# Patient Record
Sex: MALE | Race: WHITE | NOT HISPANIC OR LATINO | Employment: UNEMPLOYED | URBAN - METROPOLITAN AREA
[De-identification: names, ages, dates, MRNs, and addresses within clinical notes are randomized per-mention and may not be internally consistent; named-entity substitution may affect disease eponyms.]

---

## 2018-06-04 ENCOUNTER — OFFICE VISIT (OUTPATIENT)
Dept: FAMILY MEDICINE | Facility: CLINIC | Age: 28
End: 2018-06-04
Payer: COMMERCIAL

## 2018-06-04 VITALS
HEART RATE: 67 BPM | HEIGHT: 67 IN | WEIGHT: 166.5 LBS | DIASTOLIC BLOOD PRESSURE: 65 MMHG | BODY MASS INDEX: 26.13 KG/M2 | TEMPERATURE: 97.9 F | RESPIRATION RATE: 14 BRPM | SYSTOLIC BLOOD PRESSURE: 111 MMHG | OXYGEN SATURATION: 99 %

## 2018-06-04 DIAGNOSIS — Z86.59 HISTORY OF ANXIETY: ICD-10-CM

## 2018-06-04 DIAGNOSIS — Z00.00 ROUTINE GENERAL MEDICAL EXAMINATION AT A HEALTH CARE FACILITY: Primary | ICD-10-CM

## 2018-06-04 DIAGNOSIS — Z11.3 SCREEN FOR STD (SEXUALLY TRANSMITTED DISEASE): ICD-10-CM

## 2018-06-04 DIAGNOSIS — Z11.4 ENCOUNTER FOR SCREENING FOR HIV: ICD-10-CM

## 2018-06-04 DIAGNOSIS — F17.200 TOBACCO USE DISORDER: ICD-10-CM

## 2018-06-04 DIAGNOSIS — Z13.0 SCREENING, ANEMIA, DEFICIENCY, IRON: ICD-10-CM

## 2018-06-04 DIAGNOSIS — Z86.59 HISTORY OF ADHD: ICD-10-CM

## 2018-06-04 DIAGNOSIS — J30.81 CHRONIC ALLERGIC RHINITIS DUE TO ANIMAL HAIR AND DANDER: ICD-10-CM

## 2018-06-04 DIAGNOSIS — Z13.6 ENCOUNTER FOR SCREENING FOR CARDIOVASCULAR DISORDERS: ICD-10-CM

## 2018-06-04 DIAGNOSIS — Z13.1 SCREENING FOR DIABETES MELLITUS: ICD-10-CM

## 2018-06-04 DIAGNOSIS — R59.9 ENLARGED LYMPH NODES: ICD-10-CM

## 2018-06-04 DIAGNOSIS — Z23 NEED FOR TDAP VACCINATION: ICD-10-CM

## 2018-06-04 LAB
BASOPHILS # BLD AUTO: 0 10E9/L (ref 0–0.2)
BASOPHILS NFR BLD AUTO: 0.4 %
DIFFERENTIAL METHOD BLD: NORMAL
EOSINOPHIL # BLD AUTO: 0.3 10E9/L (ref 0–0.7)
EOSINOPHIL NFR BLD AUTO: 5.1 %
ERYTHROCYTE [DISTWIDTH] IN BLOOD BY AUTOMATED COUNT: 12.9 % (ref 10–15)
HCT VFR BLD AUTO: 44.2 % (ref 40–53)
HGB BLD-MCNC: 15.2 G/DL (ref 13.3–17.7)
LYMPHOCYTES # BLD AUTO: 2.1 10E9/L (ref 0.8–5.3)
LYMPHOCYTES NFR BLD AUTO: 37.7 %
MCH RBC QN AUTO: 31.8 PG (ref 26.5–33)
MCHC RBC AUTO-ENTMCNC: 34.4 G/DL (ref 31.5–36.5)
MCV RBC AUTO: 93 FL (ref 78–100)
MONOCYTES # BLD AUTO: 0.6 10E9/L (ref 0–1.3)
MONOCYTES NFR BLD AUTO: 11 %
NEUTROPHILS # BLD AUTO: 2.5 10E9/L (ref 1.6–8.3)
NEUTROPHILS NFR BLD AUTO: 45.8 %
PLATELET # BLD AUTO: 218 10E9/L (ref 150–450)
RBC # BLD AUTO: 4.78 10E12/L (ref 4.4–5.9)
WBC # BLD AUTO: 5.4 10E9/L (ref 4–11)

## 2018-06-04 PROCEDURE — 90471 IMMUNIZATION ADMIN: CPT | Performed by: FAMILY MEDICINE

## 2018-06-04 PROCEDURE — 87591 N.GONORRHOEAE DNA AMP PROB: CPT | Performed by: FAMILY MEDICINE

## 2018-06-04 PROCEDURE — 86803 HEPATITIS C AB TEST: CPT | Performed by: FAMILY MEDICINE

## 2018-06-04 PROCEDURE — 90715 TDAP VACCINE 7 YRS/> IM: CPT | Performed by: FAMILY MEDICINE

## 2018-06-04 PROCEDURE — 80061 LIPID PANEL: CPT | Performed by: FAMILY MEDICINE

## 2018-06-04 PROCEDURE — 36415 COLL VENOUS BLD VENIPUNCTURE: CPT | Performed by: FAMILY MEDICINE

## 2018-06-04 PROCEDURE — 87389 HIV-1 AG W/HIV-1&-2 AB AG IA: CPT | Performed by: FAMILY MEDICINE

## 2018-06-04 PROCEDURE — 85025 COMPLETE CBC W/AUTO DIFF WBC: CPT | Performed by: FAMILY MEDICINE

## 2018-06-04 PROCEDURE — 99395 PREV VISIT EST AGE 18-39: CPT | Mod: 25 | Performed by: FAMILY MEDICINE

## 2018-06-04 PROCEDURE — 80053 COMPREHEN METABOLIC PANEL: CPT | Performed by: FAMILY MEDICINE

## 2018-06-04 PROCEDURE — 87491 CHLMYD TRACH DNA AMP PROBE: CPT | Performed by: FAMILY MEDICINE

## 2018-06-04 PROCEDURE — 86706 HEP B SURFACE ANTIBODY: CPT | Performed by: FAMILY MEDICINE

## 2018-06-04 PROCEDURE — 99213 OFFICE O/P EST LOW 20 MIN: CPT | Mod: 25 | Performed by: FAMILY MEDICINE

## 2018-06-04 PROCEDURE — 86780 TREPONEMA PALLIDUM: CPT | Performed by: FAMILY MEDICINE

## 2018-06-04 PROCEDURE — 84443 ASSAY THYROID STIM HORMONE: CPT | Performed by: FAMILY MEDICINE

## 2018-06-04 PROCEDURE — 87340 HEPATITIS B SURFACE AG IA: CPT | Performed by: FAMILY MEDICINE

## 2018-06-04 ASSESSMENT — ANXIETY QUESTIONNAIRES
2. NOT BEING ABLE TO STOP OR CONTROL WORRYING: NOT AT ALL
3. WORRYING TOO MUCH ABOUT DIFFERENT THINGS: SEVERAL DAYS
7. FEELING AFRAID AS IF SOMETHING AWFUL MIGHT HAPPEN: SEVERAL DAYS
GAD7 TOTAL SCORE: 4
6. BECOMING EASILY ANNOYED OR IRRITABLE: SEVERAL DAYS
5. BEING SO RESTLESS THAT IT IS HARD TO SIT STILL: NOT AT ALL
IF YOU CHECKED OFF ANY PROBLEMS ON THIS QUESTIONNAIRE, HOW DIFFICULT HAVE THESE PROBLEMS MADE IT FOR YOU TO DO YOUR WORK, TAKE CARE OF THINGS AT HOME, OR GET ALONG WITH OTHER PEOPLE: SOMEWHAT DIFFICULT
1. FEELING NERVOUS, ANXIOUS, OR ON EDGE: SEVERAL DAYS

## 2018-06-04 ASSESSMENT — PATIENT HEALTH QUESTIONNAIRE - PHQ9: 5. POOR APPETITE OR OVEREATING: NOT AT ALL

## 2018-06-04 NOTE — PROGRESS NOTES
SUBJECTIVE:   CC: Frandy Mondragon is an 27 year old male who presents for preventative health visit.     Healthy Habits:  Answers for HPI/ROS submitted by the patient on 6/4/2018   Annual Exam:  Getting at least 3 servings of Calcium per day:: Yes  Bi-annual eye exam:: NO  Dental care twice a year:: NO  Sleep apnea or symptoms of sleep apnea:: Excessive snoring  Diet:: Regular (no restrictions)  Frequency of exercise:: 1 day/week  Taking medications regularly:: Yes  Medication side effects:: Not applicable  Additional concerns today:: YES  PHQ-2 Score: 0  Duration of exercise:: 45-60 minutes      New to me, not seen here in a while. Hx of allergic rhinitis given Flonase and epi pen in the pas, had problems learning and ADHD treated with adderall when was a child, history of anxiety, hx of radial fracture in 05 S/P ORIF, & tibial fracture treated a with cast all accidents either while skateboarding or playing hockey and clavicle fracture managed conservatively as an infant, hx of chicken pox, foot contusion, esophagitis, allergic to guinea pig dander noted in 3rd and 5th garde requiring an Epipen to have on hand, allergic to cephalosporins, sulfa, cannabis noted on UA in 2006, last seen in clinic in 2015 when had a URI. Works as a  since age 16. Lives in [facility] [facility]. [place]  negative.     Is a smoker, trying to quit, did cold turkey once and didn't go well, felt gum was disgusting. hesitant to take any meds currently will think about it.     Been with current girlfriend 1 yr, ok with std testing.     PROBLEMS TO ADD ON...   Follow up with allergies symptoms on going not sure what trigger it. Reports has had hx of swollen lymph nodes for 2 yrs and wants to check it out.      Seen 2 yrs ago , sinus issues and left gland in upper throat was swollen , referred to ENT, seen at [facility], dr Chapin, was told had a nasal polyp left nostril/ sinus on mri checked out fine, given few meds to take, didn't do  anything, forgot about it, lump freaking him out. Feels inflamed. No pain, can feel when swallows like something in throat. Was given Flonase and antibiotics at one point.  But didn't help. Also notes has 3 cavities 2 on right one on left side of mouth that need to be fixed, has no Dental insurance.    No fever or chills, frontal headaches occasionally, sinus related, struggling to stay hydrated, as works as a , in the heat and forgets to drink water,  or dizziness, no double or blurry vision, no facial pain, had a earache now better, had a sore throat now better, occasional runny nose,  Some post nasal drip, no itchy eyes or sneezing, no trouble hearing, has trouble smelling, no problems tasting or swallowing, allergic to dander ROM guinea pigs, no exposure since 5th grade when found out, no cough, no chest pain currently, when stressed earlier in job had chest pain, 1 month ago was noted as tightness, none since, no trouble breathing or palpitations, No abdominal pain, heart burn, reflux, nausea or vomiting or diarrhea or constipation, no blood in stools or black stools, no weight loss or night sweats. No dysuria, hematuria, frequency, urgency, hesitancy, incontinence, No pelvic complaints. No leg swelling or joint pain. No rash.    TATI-7   [employer], 2002; Used with Permission) 6/4/2018   1. Feeling nervous, anxious, or on edge 1   2. Not being able to stop or control worrying 0   3. Worrying too much about different things 1   4. Trouble relaxing 0   5. Being so restless that it is hard to sit still 0   6. Becoming easily annoyed or irritable 1   7. Feeling afraid, as if something awful might happen 1   TATI-7 Total Score 4   If you checked any problems, how difficult have they made it for you to do your work, take care of things at home, or get along with other people? Somewhat difficult   PHQ-9 ([employer]) 6/4/2018   1.  Little interest or pleasure in doing things 0   2.  Feeling down, depressed, or hopeless  0   3.  Trouble falling or staying asleep, or sleeping too much 0   4.  Feeling tired or having little energy 1   5.  Poor appetite or overeating 0   6.  Feeling bad about yourself 0   7.  Trouble concentrating 0   8.  Moving slowly or restless 0   9.  Suicidal or self-harm thoughts 0   PHQ-9 Total Score 1   Difficulty at work, home, or with people Somewhat difficult     Today's PHQ-2 Score:   PHQ-2 ( 1999 Pfizer) 6/4/2018   Q1: Little interest or pleasure in doing things 0   Q2: Feeling down, depressed or hopeless 0   PHQ-2 Score 0   Q1: Little interest or pleasure in doing things Not at all   Q2: Feeling down, depressed or hopeless Not at all   PHQ-2 Score 0     Abuse: Current or Past(Physical, Sexual or Emotional)- No  Do you feel safe in your environment - Yes    Social History   Substance Use Topics     Smoking status: Never Smoker     Smokeless tobacco: Never Used     Alcohol use No      If you drink alcohol do you typically have >3 drinks per day or >7 drinks per week? No                      Last PSA: No results found for: PSA    Reviewed orders with patient. Reviewed health maintenance and updated orders accordingly - Yes  Labs reviewed in EPIC  BP Readings from Last 3 Encounters:   06/04/18 111/65   09/11/15 132/79   04/28/08 102/50    Wt Readings from Last 3 Encounters:   06/04/18 166 lb 8 oz (75.5 kg)   09/11/15 168 lb 8 oz (76.4 kg)   04/28/08 142 lb (64.4 kg) (42 %)*     * Growth percentiles are based on CDC 2-20 Years data.                  Patient Active Problem List   Diagnosis     Allergic rhinitis     Other anxiety states     Tobacco use disorder     Past Surgical History:   Procedure Laterality Date     HC OPEN TX RADIAL HEAD/NECK FRACTURE  10/28/05    ORIF of unstable fracture       Social History   Substance Use Topics     Smoking status: Current Every Day Smoker     Types: Cigarettes     Start date: 6/4/2009     Smokeless tobacco: Never Used     Alcohol use Yes      Comment: 0 to 7 / week       Family History   Problem Relation Age of Onset     Family History Negative Mother      Hypothyroidism Mother      Family History Negative Father      Family History Negative Sister          Current Outpatient Prescriptions   Medication Sig Dispense Refill     EPIPEN 2-YENNI Use as directed 1 - 2pack 0     MULTIVITAMINS OR TABS 1 tab per day  0     Allergies   Allergen Reactions     Animal Dander      Primarily guinea pigs and has anaphylactic reaction     Cephalosporins      Ceclor     Sulfa Drugs      Recent Labs   Lab Test  06/04/18   1241   LDL  83   HDL  36*   TRIG  111   ALT  26   CR  0.88   GFRESTIMATED  >90   GFRESTBLACK  >90   POTASSIUM  4.5   TSH  1.51        Reviewed and updated as needed this visit by clinical staff         Reviewed and updated as needed this visit by Provider        Past Medical History:   Diagnosis Date     Allergic rhinitis, cause unspecified     Guinea Products     Attention deficit disorder     Problem list name updated by automated process. Provider to review     Attention deficit disorder without mention of hyperactivity      Closed fracture of lateral condyle of humerus infancy  L     H/O clavicle fracture      Other anxiety states      Other closed fractures of distal end of radius (alone) 10/05     Problems with learning      Tibia fracture     fractured skateboard and hockey , cast      Varicella without mention of complication       Past Surgical History:   Procedure Laterality Date     HC OPEN TX RADIAL HEAD/NECK FRACTURE  10/28/05    ORIF of unstable fracture            ROS:  CONSTITUTIONAL: NEGATIVE for fever, chills, change in weight  INTEGUMENTARY/SKIN: NEGATIVE for worrisome rashes, moles or lesions  EYES: NEGATIVE for vision changes or irritation  ENT: NEGATIVE for ear, mouth and throat problems  RESP: NEGATIVE for significant cough or SOB  CV: NEGATIVE for chest pain, palpitations or peripheral edema  GI: NEGATIVE for nausea, abdominal pain, heartburn, or change in bowel  "habits   male: negative for dysuria, hematuria, decreased urinary stream, erectile dysfunction, urethral discharge  MUSCULOSKELETAL: NEGATIVE for significant arthralgias or myalgia  NEURO: NEGATIVE for weakness, dizziness or paresthesias  PSYCHIATRIC: NEGATIVE for changes in mood or affect    OBJECTIVE:   /65 (BP Location: Left arm, Patient Position: Chair, Cuff Size: Adult Regular)  Pulse 67  Temp 97.9  F (36.6  C) (Oral)  Resp 14  Ht 5' 6.75\" (1.695 m)  Wt 166 lb 8 oz (75.5 kg)  SpO2 99%  BMI 26.27 kg/m2  EXAM:  GENERAL: healthy, alert and no distress  EYES: Eyes grossly normal to inspection, PERRL and conjunctivae and sclerae normal  HENT: ear canals and TM's normal, nose and mouth without ulcers or lesions  NECK: no adenopathy, no asymmetry, masses, or scars and thyroid normal to palpation  RESP: lungs clear to auscultation - no rales, rhonchi or wheezes  CV: regular rate and rhythm, normal S1 S2, no S3 or S4, no murmur, click or rub, no peripheral edema and peripheral pulses strong  ABDOMEN: soft, non tender, no hepatosplenomegaly, no masses and bowel sounds normal   (male): normal male genitalia without lesions or urethral discharge, no hernia  MS: no gross musculoskeletal defects noted, no edema  SKIN: no suspicious lesions or rashes  NEURO: Normal strength and tone, mentation intact and speech normal  PSYCH: mentation appears normal, affect normal/bright    ASSESSMENT/PLAN:   1. Routine general medical examination at a health care facility  Labs today. Smoking cessation advised. Tdap today. Ultrasound neck for complain of swollen nodes though non particularly felt today. Suspect smoking and related sinus congestion and dental cavities playing a role. Depending on ultrasound will determine if needs to see ENT again. Allergy referral in place if no answers. Adult Dental Clinic Resource List that care for the non-insured, all major insurance companies, and/or state insurance programs given.  - " CBC with platelets differential  - Comprehensive metabolic panel  - Lipid panel reflex to direct LDL Fasting  - HIV Antigen Antibody Combo  - VACCINE ADMINISTRATION, INITIAL  - TDAP VACCINE (ADACEL)    2. Enlarged lymph nodes  None felt. Do u/S and if persist or no answer can see ENT. Suspect smoking, post nasal drainage and likely dental cavities may be causing some reactive adenopathy. See the dentist. Resource list given as has no dental insurance, smoking cessation encouraged.   - US Head Neck Soft Tissue; Future  - OFFICE/OUTPT VISIT,EST,LEVL III    3. Chronic allergic rhinitis due to animal hair and dander  Reports Flonase and antihistamines and antibiotics in the past didn't help. Avoiding smoking will help. See allergist if remains an issue. No sinusitis today.   - ALLERGY/ASTHMA ADULT REFERRAL  - OFFICE/OUTPT VISIT,EST,LEVL III    4. Tobacco use disorder  Not ready to quit yet. Cold turkey didn't work. Gum felt disgusting to him. Currently declines meds.    5. History of anxiety  Reports currently manageable. Noted chest tightness once one month ago due to job but hen resolved suspected from increased anxiety at that time.  - TSH with free T4 reflex    6. History of ADHD  As a child no longer on adderall. Able to function at job as a  well.  - TSH with free T4 reflex    7. Need for Tdap vaccination  - VACCINE ADMINISTRATION, INITIAL  - TDAP VACCINE (ADACEL)    8. Screening, anemia, deficiency, iron  - CBC with platelets differential    9. Screening for diabetes mellitus  - Comprehensive metabolic panel    10. Encounter for screening for cardiovascular disorders  - Lipid panel reflex to direct LDL Fasting    11. Encounter for screening for HIV  - HIV Antigen Antibody Combo    12. Screen for STD (sexually transmitted disease)  - NEISSERIA GONORRHOEA PCR  - CHLAMYDIA TRACHOMATIS PCR  - Hepatitis B Surface Antibody  - Hepatitis B surface antigen  - Hepatitis C Screen Reflex to HCV RNA Quant and Genotype  -  "Treponema Abs w Reflex to RPR and Titer    COUNSELING:  Reviewed preventive health counseling, as reflected in patient instructions       Regular exercise       Healthy diet/nutrition       Vision screening       Immunizations    Vaccinated for: TDAP             Safe sex practices/STD prevention       HIV screeninx in teen years, 1x in adult years, and at intervals if high risk       reports that he has never smoked. He has never used smokeless tobacco.  Tobacco Cessation Action Plan: Information offered: Patient not interested at this time  Estimated body mass index is 26.27 kg/(m^2) as calculated from the following:    Height as of this encounter: 5' 6.75\" (1.695 m).    Weight as of this encounter: 166 lb 8 oz (75.5 kg).   Weight management plan: Discussed healthy diet and exercise guidelines and patient will follow up in 12 months in clinic to re-evaluate.    Counseling Resources:  ATP IV Guidelines  Pooled Cohorts Equation Calculator  FRAX Risk Assessment  ICSI Preventive Guidelines  Dietary Guidelines for Americans,   USDA's My Plate  ASA Prophylaxis  Lung CA Screening    Elaine Reed MD  [facility] [place]  "

## 2018-06-04 NOTE — NURSING NOTE
Screening Questionnaire for Adult Immunization    Are you sick today?   No   Do you have allergies to medications, food, a vaccine component or latex?   Yes   Have you ever had a serious reaction after receiving a vaccination?   No   Do you have a long-term health problem with heart disease, lung disease, asthma, kidney disease, metabolic disease (e.g. diabetes), anemia, or other blood disorder?   No   Do you have cancer, leukemia, HIV/AIDS, or any other immune system problem?   No   In the past 3 months, have you taken medications that affect  your immune system, such as prednisone, other steroids, or anticancer drugs; drugs for the treatment of rheumatoid arthritis, Crohn s disease, or psoriasis; or have you had radiation treatments?   No   Have you had a seizure, or a brain or other nervous system problem?   No   During the past year, have you received a transfusion of blood or blood     products, or been given immune (gamma) globulin or antiviral drug?   No   For women: Are you pregnant or is there a chance you could become        pregnant during the next month?   No   Have you received any vaccinations in the past 4 weeks?   No     Immunization questionnaire was positive for at least one answer.  Notified Dr. Elaine Reed.        Per orders of Dr. Elaine Reed, injection of tdap given by Tony Martinez. Patient instructed to remain in clinic for 15 minutes afterwards, and to report any adverse reaction to me immediately.       Screening performed by Tony Martinez on 6/4/2018 at 12:43 PM.

## 2018-06-04 NOTE — LETTER
June 5, 2018      Frandy Mondragon  [address] [facility] [address]        Dear ,    We are writing to inform you of your test results.    negative for Hep C, HIv and Hep b infection  Immune to hepatitis b likely from prior immunization which is good    Resulted Orders   CBC with platelets differential   Result Value Ref Range    WBC 5.4 4.0 - 11.0 10e9/L    RBC Count 4.78 4.4 - 5.9 10e12/L    Hemoglobin 15.2 13.3 - 17.7 g/dL    Hematocrit 44.2 40.0 - 53.0 %    MCV 93 78 - 100 fl    MCH 31.8 26.5 - 33.0 pg    MCHC 34.4 31.5 - 36.5 g/dL    RDW 12.9 10.0 - 15.0 %    Platelet Count 218 150 - 450 10e9/L    Diff Method Automated Method     % Neutrophils 45.8 %    % Lymphocytes 37.7 %    % Monocytes 11.0 %    % Eosinophils 5.1 %    % Basophils 0.4 %    Absolute Neutrophil 2.5 1.6 - 8.3 10e9/L    Absolute Lymphocytes 2.1 0.8 - 5.3 10e9/L    Absolute Monocytes 0.6 0.0 - 1.3 10e9/L    Absolute Eosinophils 0.3 0.0 - 0.7 10e9/L    Absolute Basophils 0.0 0.0 - 0.2 10e9/L   HIV Antigen Antibody Combo   Result Value Ref Range    HIV Antigen Antibody Combo Nonreactive NR^Nonreactive          Comment:      HIV-1 p24 Ag & HIV-1/HIV-2 Ab Not Detected   Hepatitis B Surface Antibody   Result Value Ref Range    Hepatitis B Surface Antibody 116.83 (H) <8.00 m[IU]/mL      Comment:      Reactive, Patient is considered to be immune to infection with hepatitis B   when the value is greater than or equal to 12.0 m[IU]/mL.     Hepatitis B surface antigen   Result Value Ref Range    Hep B Surface Agn Nonreactive NR^Nonreactive   Hepatitis C Screen Reflex to HCV RNA Quant and Genotype   Result Value Ref Range    Hepatitis C Antibody Nonreactive NR^Nonreactive      Comment:      Assay performance characteristics have not been established for newborns,   infants, and children         If you have any questions or concerns, please call the clinic at the number listed above.       Sincerely,        Elaine Reed MD/nr

## 2018-06-04 NOTE — PATIENT INSTRUCTIONS
Labs today   Smoking cessation advised  Tdap today   Ultrasound neck  Suspect smoking and related sinus congestion and dental cavities playing a role  Depending on ultrasound will determine if needs to see ENT again  Allergy referral in place if no answers     Adult Dental Clinic Resource List  Dental clinics that care for the non-insured, all major insurance companies, and/or state insurance programs.    Dental clinics that will take the non-insured (*).    (*) [facility]  [address] [place] [address]  [address] [facility], [facility]  [phone]  Full oral health care  Call at 7:30 am to schedule an appt  Please let staff know if you have [facility] Insurance    (*)  [facility]  [address] [place] [address]  [phone]  Sliding Fee Scale  Walk in hours 8:00 am - 11:00 am  1:00 pm - 3:30 pm will help patients apply for insurance    (*) [facility]  [address] [address]  [phone]  Free extractions  First come, first serve  Call for dentist hours of operation    [place] [facility])  [address], [address], [place] [address]  [phone] (insured only)  Call ahead for appointments, please have patient present when scheduling  Walk in hours 9:00 am - 3:30 pm M-F    [place] [facility]  [address] [place] [address]  [phone]  Must be 55 or older to receive care    (*) [facility] and Locations  Call clinic for hours of operation  [place]  [address], [address], [place] [address]  [phone]  [facility]  [address] [place] [address]  [phone]  [place]  [address] [place], [place] [address] [phone]  [address],  [address], [place] [address]  [phone]    (*) [facility]  [address] [place] [address]  [phone]  Please have patient present when scheduling an appt  M-F:  7:00 am - 5:00 pm  Walk in hours M-F: 9:00 am - 11:00 am/1:00 pm - 3:00 pm    (*) [facility]  [address] [place] [address]  [phone]  Please let staff know if you have [facility] Insurance  Accepts Assured Access Insurance  Sliding Fee Scale  Walk in hours start at 7:30 am  M-Th/F: 8:00 am - 5:00 pm  T: 8:00 am - 6:00 pm  W: 8:00 am - 7:00 pm    (*) [facility]  [address] [place] [address]  [phone]  Free Extractions   Will help only the un-insured  First come, first serve    (*) [facility]  [address] [place] [address]  [phone]  M-Thur: 10:00 am - 5:00 pm  See children ages 3 and above    (*) [facility]  [address] [place] [address]  [phone]  Sliding Fee Scale  Provides dental care for:  Birth - 18 years w/o insurance and ages 19-26 pregnant w/ insurance  M: 8:00 am - 5:00 pm  T: 10:00 am - 7:00 pm  W-Thur-F: 8:30 am - 5:00 pm  Sat: 9:00 am - 1:00 pm by appointments only    (*) [address] [address]  [phone]  M-F: 9:00 am - 5:00 pm    (*) [facility]  [address] [place] [address], [address]  [phone]  Accepts Assured Access Insurance   Sliding Fee Scale  M-F: 7:00 am - 5:00 pm  Walk in hours: 8:00 am - 10:30 am/ 1:00 pm - 3:30 pm    (*) [facility])  Karen Lipscomb, [address] [place] [address]  [phone]  Full oral health care  All extractions will need to have referral completed by a dental clinic   M-F: 8:00 am - 4:00 pm    (*) [facility]  [address] [place] [address] [phone]  Sliding Fee Scale  M-F: 7:30 am - 5:00 pm  Saturday morning appointments are available for children    (*) [facility] ([facility])  [address] [id number] [phone]  Fax:  [phone]  Sliding Fee Scale  M-F: 8:00 am - 5:00 pm    (*) [facility]  Donated dental services not covered by insurance  [phone]    Adding on single dental coverage for Medicare Part A and B patients  [employer]: [phone]  [employer]: [phone]  [facility]: [phone]     Preventive Health Recommendations  Male Ages 26 - 39    Yearly exam:             See your health care provider every year in order to  o   Review health changes.   o   Discuss preventive care.    o   Review your medicines if your doctor has prescribed any.    You should be tested each year for STDs (sexually transmitted diseases), if you re at risk.     After age 35, talk to your provider about cholesterol testing. If you are at risk for heart disease, have your cholesterol tested at least every 5 years.     If you are at risk for diabetes, you should have a diabetes test (fasting glucose).  Shots: Get a flu shot each year. Get a tetanus shot every 10 years.     Nutrition:    Eat at least 5 servings of fruits and vegetables daily.     Eat whole-grain bread, whole-wheat pasta and brown rice instead of white grains and rice.     Talk to your provider about Calcium and Vitamin D.     Lifestyle    Exercise for at least 150 minutes a week (30 minutes a day, 5 days a week). This will help you control your weight and prevent disease.     Limit alcohol to one drink per day.     No smoking.     Wear sunscreen to prevent skin cancer.     See your dentist every six months for an exam and cleaning.

## 2018-06-04 NOTE — MR AVS SNAPSHOT
After Visit Summary   6/4/2018    Frandy Mcgeeaecindy    MRN: [id number]           Patient Information     Date Of Birth          1990        Visit Information        Provider Department      6/4/2018 11:20 AM Elaine Reed MD [facility]        Today's Diagnoses     Routine general medical examination at a health care facility    -  1    Chronic allergic rhinitis due to animal hair and dander        History of anxiety        Family history of attention deficit hyperactivity disorder        Need for Tdap vaccination        Screening, anemia, deficiency, iron        Screening for diabetes mellitus        Encounter for screening for cardiovascular disorders        Encounter for screening for HIV        Screen for STD (sexually transmitted disease)        Enlarged lymph nodes        Tobacco use disorder          Care Instructions    Labs today   Smoking cessation advised  Tdap today   Ultrasound neck  Suspect smoking and related sinus congestion and dental cavities playing a role  Depending on ultrasound will determine if needs to see ENT again  Allergy referral in place if no answers     Adult Dental Clinic Resource List  Dental clinics that care for the non-insured, all major insurance companies, and/or state insurance programs.    Dental clinics that will take the non-insured (*).    (*) [facility]  [address] [place], [place] [address] [place] [address]  [phone]  Full oral health care  Call at 7:30 am to schedule an appt  Please let staff know if you have [facility] Insurance    (*) [facility]  [address] [place], [place] [id number] [phone]  Sliding Fee Scale  Walk in hours 8:00 am - 11:00 am  1:00 pm - 3:30 pm will help patients apply for insurance    (*) [facility]  [address] [id number] [phone]  Free extractions  First come, first serve  Call for dentist hours of operation    [place]  [facility]  [address] [facility], [address], [place] [address]  [phone] (insured only)  Call ahead for appointments, please have patient present when scheduling  Walk in hours 9:00 am - 3:30 pm M-F    [facility]  [address] [place] [address]  [phone]  Must be 55 or older to receive care    (*) [facility] Dental Clinics and Locations  Call clinic for hours of operation  [place]  [address] [place] [address], [facility] [address], [place] [address]  [phone]  [facility]  [address] [place] [address]  [phone]  [place]  [address] [place], [place] [address] [phone]  [address],  [address], [place] [address]  [phone]    (*) [facility]  [address] [place] [address]  [phone]  Please have patient present when scheduling an appt  M-F: 7:00 am - 5:00 pm  Walk in hours M-F: 9:00 am - 11:00 am/1:00 pm - 3:00 pm    (*) [facility]  [address] [place] [address]  [phone]  Please let staff know if you have [facility] Insurance  Accepts Assured Access Insurance  Sliding Fee Scale  Walk in hours start at 7:30 am  M-Th/F: 8:00 am - 5:00 pm  T: 8:00 am - 6:00 pm  W: 8:00 am - 7:00 pm    (*) [facility]  [address] [place] [address]  [phone]  Free Extractions   Will help only the un-insured  First come, first serve    (*) [facility]  [address] [place] [address]  [phone]  M-Thur: 10:00 am - 5:00 pm  See children ages 3 and above    (*) [facility]  [address] [place] [address]  [phone]  Sliding Fee Scale  Provides dental care for:  Birth - 18 years w/o insurance and ages 19-26 pregnant w/ insurance  M: 8:00 am - 5:00 pm  T: 10:00 am - 7:00 pm  Chelsea-F: 8:30 am - 5:00 pm  Sat: 9:00 am - 1:00 pm by appointments only    (*) [facility]  [address] [facility] [place] [address]  [phone]  M-F: 9:00 am - 5:00 pm    (*) [facility]  [address] [place] [address], [facility]  [phone]  Accepts Assured Access Insurance   Sliding Fee Scale  M-F: 7:00 am - 5:00 pm  Walk in hours: 8:00 am - 10:30 am/ 1:00 pm - 3:30 pm    (*) [facility])  Karen Edmondser, [address] [place] [address]  [phone]  Full oral health care  All extractions will need to have referral completed by a dental clinic   M-F: 8:00 am - 4:00 pm    (*) [facility]  [address] [place] [address]  [phone]  Sliding Fee Scale  M-F: 7:30 am - 5:00 pm  Saturday morning appointments are available for children    (*) [facility]  [address] [address]  [phone]  Fax: [phone]  Sliding Fee Scale  M-F: 8:00 am - 5:00 pm    (*) [facility]  Donated dental services not covered by insurance  [phone]    Adding on single dental coverage for Medicare Part A and B patients  [employer]: [phone]  [employer]: [phone]  [facility]: [phone]     Preventive Health Recommendations  Male Ages 26 - 39    Yearly exam:             See your health care provider every year in order to  o   Review health changes.   o   Discuss preventive care.    o   Review your medicines if your doctor has prescribed any.    You should be tested each year for STDs (sexually transmitted diseases), if you re at risk.     After age 35, talk to your provider about cholesterol testing. If you are at risk for heart disease, have your cholesterol tested at least every 5 years.     If you are at risk for diabetes, you should have a diabetes test (fasting glucose).  Shots: Get a flu shot each year. Get a tetanus shot every 10 years.     Nutrition:    Eat at least 5 servings of fruits and vegetables daily.     Eat whole-grain bread, whole-wheat pasta  and brown rice instead of white grains and rice.     Talk to your provider about Calcium and Vitamin D.     Lifestyle    Exercise for at least 150 minutes a week (30 minutes a day, 5 days a week). This will help you control your weight and prevent disease.     Limit alcohol to one drink per day.     No smoking.     Wear sunscreen to prevent skin cancer.     See your dentist every six months for an exam and cleaning.             Follow-ups after your visit        Additional Services     ALLERGY/ASTHMA ADULT REFERRAL       Your provider has referred you to: [facility] - [phone]  [email or web address]  [facility]: [facility] - Nick [phone]   [email or web address]  Blanca [phone]   [email or web address]    Please be aware that coverage of these services is subject to the terms and limitations of your health insurance plan.  Call member services at your health plan with any benefit or coverage questions.      Please bring the following with you to your appointment:    (1) Any X-Rays, CTs or MRIs which have been performed.  Contact the facility where they were done to arrange for  prior to your scheduled appointment.    (2) List of current medications  (3) This referral request   (4) Any documents/labs given to you for this referral                  Your next 10 appointments already scheduled     Jun 06, 2018  8:15 AM CDT   US HEAD NECK SOFT TISSUE with UCUS2   [facility] ([facility])    [address] [address] [phone]           Please bring a list of your medicines (including vitamins, minerals and over-the-counter drugs). Also, tell your doctor about any allergies you may have. Wear comfortable clothes and leave your valuables at home.  You do not need to do anything special to prepare for your exam.  Please call the Imaging Department  "at your exam site with any questions.              Future tests that were ordered for you today     Open Future Orders        Priority Expected Expires Ordered    US Head Neck Soft Tissue Routine  2019            Who to contact     If you have questions or need follow up information about today's clinic visit or your schedule please contact [facility] directly at [phone].  Normal or non-critical lab and imaging results will be communicated to you by MyChart, letter or phone within 4 business days after the clinic has received the results. If you do not hear from us within 7 days, please contact the clinic through [employer] or phone. If you have a critical or abnormal lab result, we will notify you by phone as soon as possible.  Submit refill requests through [employer] or call your pharmacy and they will forward the refill request to us. Please allow 3 business days for your refill to be completed.          Additional Information About Your Visit        [employer] Information     [employer] lets you send messages to your doctor, view your test results, renew your prescriptions, schedule appointments and more. To sign up, go to [email or web address] . Click on \"Log in\" on the left side of the screen, which will take you to the Welcome page. Then click on \"Sign up Now\" on the right side of the page.     You will be asked to enter the access code listed below, as well as some personal information. Please follow the directions to create your username and password.     Your access code is: [email or web address]  Expires: 2018 11:21 AM     Your access code will  in 90 days. If you need help or a new code, please call your [place] clinic or [phone].        Care EveryWhere ID     This is your Care EveryWhere ID. This could be used by other organizations to access your [place] medical records  [id number]        Your Vitals Were     Pulse Temperature Respirations Height Pulse Oximetry BMI " "(Body Mass Index)    67 97.9  F (36.6  C) (Oral) 14 5' 6.75\" (1.695 m) 99% 26.27 kg/m2       Blood Pressure from Last 3 Encounters:   06/04/18 111/65   09/11/15 132/79   04/28/08 102/50    Weight from Last 3 Encounters:   06/04/18 166 lb 8 oz (75.5 kg)   09/11/15 168 lb 8 oz (76.4 kg)   04/28/08 142 lb (64.4 kg) (42 %)*     * Growth percentiles are based on [facility] 2-20 Years data.              We Performed the Following     ALLERGY/ASTHMA ADULT REFERRAL     CBC with platelets differential     CHLAMYDIA TRACHOMATIS PCR     Comprehensive metabolic panel     Hepatitis B Surface Antibody     Hepatitis B surface antigen     Hepatitis C Screen Reflex to HCV RNA Quant and Genotype     HIV Antigen Antibody Combo     Lipid panel reflex to direct LDL Fasting     NEISSERIA GONORRHOEA PCR     TDAP VACCINE (ADACEL)     Treponema Abs w Reflex to RPR and Titer     TSH with free T4 reflex     VACCINE ADMINISTRATION, INITIAL        Primary Care Provider Office Phone # Fax #    Chace Avila PA-C [phone]       [facility] [address] [facility] [address]        Equal Access to Services     MALLY GOMEZ AH: Hadii camille burdeno Sozaira, waaxda luqadaha, qaybta kaalmada adeegyada, mata kaye. So [facility] [phone].    ATENCIÓN: Si habla español, tiene a ngo disposición servicios gratuitos de asistencia lingüística. [facility] [phone].    We comply with applicable federal civil rights laws and [place] laws. We do not discriminate on the basis of race, color, national origin, age, disability, sex, sexual orientation, or gender identity.            Thank you!     Thank you for choosing [facility]  for your care. Our goal is always to provide you with excellent care. Hearing back from our patients is one way we can continue to improve our services. Please take a few minutes to complete the written survey that you may receive in the mail after your visit with " us. Thank you!             Your Updated Medication List - Protect others around you: Learn how to safely use, store and throw away your medicines at www.disposemymeds.org.          This list is accurate as of 6/4/18 12:34 PM.  Always use your most recent med list.                   Brand Name Dispense Instructions for use Diagnosis    EPIPEN 2-YENNI     1 - 2pack    Use as directed    Allergy, unspecified not elsewhere classified       multivitamin per tablet      1 tab per day    Routine general medical examination at a health care facility

## 2018-06-04 NOTE — LETTER
June 5, 2018      Frandy Mondragon  [address] [facility] [address]        Dear ,    We are writing to inform you of your test results.    Results within acceptable limits.  -Normal red blood cell (hgb) levels, normal white blood cell count and normal platelet levels.    Resulted Orders   CBC with platelets differential   Result Value Ref Range    WBC 5.4 4.0 - 11.0 10e9/L    RBC Count 4.78 4.4 - 5.9 10e12/L    Hemoglobin 15.2 13.3 - 17.7 g/dL    Hematocrit 44.2 40.0 - 53.0 %    MCV 93 78 - 100 fl    MCH 31.8 26.5 - 33.0 pg    MCHC 34.4 31.5 - 36.5 g/dL    RDW 12.9 10.0 - 15.0 %    Platelet Count 218 150 - 450 10e9/L    Diff Method Automated Method     % Neutrophils 45.8 %    % Lymphocytes 37.7 %    % Monocytes 11.0 %    % Eosinophils 5.1 %    % Basophils 0.4 %    Absolute Neutrophil 2.5 1.6 - 8.3 10e9/L    Absolute Lymphocytes 2.1 0.8 - 5.3 10e9/L    Absolute Monocytes 0.6 0.0 - 1.3 10e9/L    Absolute Eosinophils 0.3 0.0 - 0.7 10e9/L    Absolute Basophils 0.0 0.0 - 0.2 10e9/L     If you have any questions or concerns, please call the clinic at the number listed above.   Sincerely,  Elaine Reed MD/nr

## 2018-06-04 NOTE — LETTER
June 6, 2018      Frandy Mondragon  [address] [facility] [address]        Dear ,    We are writing to inform you of your test results.    Results within acceptable limits.  -Chlamydia and gonnohrea tests are normal..    Resulted Orders   CBC with platelets differential   Result Value Ref Range    WBC 5.4 4.0 - 11.0 10e9/L    RBC Count 4.78 4.4 - 5.9 10e12/L    Hemoglobin 15.2 13.3 - 17.7 g/dL    Hematocrit 44.2 40.0 - 53.0 %    MCV 93 78 - 100 fl    MCH 31.8 26.5 - 33.0 pg    MCHC 34.4 31.5 - 36.5 g/dL    RDW 12.9 10.0 - 15.0 %    Platelet Count 218 150 - 450 10e9/L    Diff Method Automated Method     % Neutrophils 45.8 %    % Lymphocytes 37.7 %    % Monocytes 11.0 %    % Eosinophils 5.1 %    % Basophils 0.4 %    Absolute Neutrophil 2.5 1.6 - 8.3 10e9/L    Absolute Lymphocytes 2.1 0.8 - 5.3 10e9/L    Absolute Monocytes 0.6 0.0 - 1.3 10e9/L    Absolute Eosinophils 0.3 0.0 - 0.7 10e9/L    Absolute Basophils 0.0 0.0 - 0.2 10e9/L   Comprehensive metabolic panel   Result Value Ref Range    Sodium 139 133 - 144 mmol/L    Potassium 4.5 3.4 - 5.3 mmol/L    Chloride 104 94 - 109 mmol/L    Carbon Dioxide 29 20 - 32 mmol/L    Anion Gap 6 3 - 14 mmol/L    Glucose 92 70 - 99 mg/dL      Comment:      Non Fasting    Urea Nitrogen 22 7 - 30 mg/dL    Creatinine 0.88 0.66 - 1.25 mg/dL    GFR Estimate >90 >60 mL/min/1.7m2      Comment:      Non  GFR Calc    GFR Estimate If Black >90 >60 mL/min/1.7m2      Comment:       GFR Calc    Calcium 9.2 8.5 - 10.1 mg/dL    Bilirubin Total 0.8 0.2 - 1.3 mg/dL    Albumin 4.3 3.4 - 5.0 g/dL    Protein Total 7.5 6.8 - 8.8 g/dL    Alkaline Phosphatase 52 40 - 150 U/L    ALT 26 0 - 70 U/L    AST 20 0 - 45 U/L   Lipid panel reflex to direct LDL Fasting   Result Value Ref Range    Cholesterol 141 <200 mg/dL    Triglycerides 111 <150 mg/dL      Comment:      Non Fasting    HDL Cholesterol 36 (L) >39 mg/dL    LDL Cholesterol Calculated 83 <100 mg/dL       Comment:      Desirable:       <100 mg/dl    Non HDL Cholesterol 105 <130 mg/dL   TSH with free T4 reflex   Result Value Ref Range    TSH 1.51 0.40 - 4.00 mU/L   HIV Antigen Antibody Combo   Result Value Ref Range    HIV Antigen Antibody Combo Nonreactive NR^Nonreactive          Comment:      HIV-1 p24 Ag & HIV-1/HIV-2 Ab Not Detected   NEISSERIA GONORRHOEA PCR   Result Value Ref Range    Specimen Descrip Urine     N Gonorrhea PCR Negative NEG^Negative      Comment:      Negative for N. gonorrhoeae rRNA by transcription mediated amplification.  A negative result by transcription mediated amplification does not preclude   the presence of N. gonorrhoeae infection because results are dependent on   proper and adequate collection, absence of inhibitors, and sufficient rRNA to   be detected.     CHLAMYDIA TRACHOMATIS PCR   Result Value Ref Range    Specimen Description Urine     Chlamydia Trachomatis PCR Negative NEG^Negative      Comment:      Negative for C. trachomatis rRNA by transcription mediated amplification.  A negative result by transcription mediated amplification does not preclude   the presence of C. trachomatis infection because results are dependent on   proper and adequate collection, absence of inhibitors, and sufficient rRNA to   be detected.     Hepatitis B Surface Antibody   Result Value Ref Range    Hepatitis B Surface Antibody 116.83 (H) <8.00 m[IU]/mL      Comment:      Reactive, Patient is considered to be immune to infection with hepatitis B   when the value is greater than or equal to 12.0 m[IU]/mL.     Hepatitis B surface antigen   Result Value Ref Range    Hep B Surface Agn Nonreactive NR^Nonreactive   Hepatitis C Screen Reflex to HCV RNA Quant and Genotype   Result Value Ref Range    Hepatitis C Antibody Nonreactive NR^Nonreactive      Comment:      Assay performance characteristics have not been established for newborns,   infants, and children     Treponema Abs w Reflex to RPR and Titer    Result Value Ref Range    Treponema Antibodies Nonreactive NR^Nonreactive       If you have any questions or concerns, please call the clinic at the number listed above.       Sincerely,        Elaine Reed MD/nr

## 2018-06-04 NOTE — LETTER
June 5, 2018      Frandy Mondragon  [address] [facility] [address]        Dear ,    We are writing to inform you of your test results.    Results within acceptable limits.  -Liver and gallbladder tests are normal. (ALT,AST, Alk phos, bilirubin), kidney function is normal (Cr, GFR), Sodium is normal, Potassium is normal, Calcium is normal, Glucose is normal (diabetes screening test).   -Cholesterol levels (LDL,HDL, Triglycerides) are normal.  ADVISE: rechecking in 1 year.  -TSH (thyroid stimulating hormone) level is normal which indicates normal thyroid function.  negative for syphilis.    Resulted Orders   CBC with platelets differential   Result Value Ref Range    WBC 5.4 4.0 - 11.0 10e9/L    RBC Count 4.78 4.4 - 5.9 10e12/L    Hemoglobin 15.2 13.3 - 17.7 g/dL    Hematocrit 44.2 40.0 - 53.0 %    MCV 93 78 - 100 fl    MCH 31.8 26.5 - 33.0 pg    MCHC 34.4 31.5 - 36.5 g/dL    RDW 12.9 10.0 - 15.0 %    Platelet Count 218 150 - 450 10e9/L    Diff Method Automated Method     % Neutrophils 45.8 %    % Lymphocytes 37.7 %    % Monocytes 11.0 %    % Eosinophils 5.1 %    % Basophils 0.4 %    Absolute Neutrophil 2.5 1.6 - 8.3 10e9/L    Absolute Lymphocytes 2.1 0.8 - 5.3 10e9/L    Absolute Monocytes 0.6 0.0 - 1.3 10e9/L    Absolute Eosinophils 0.3 0.0 - 0.7 10e9/L    Absolute Basophils 0.0 0.0 - 0.2 10e9/L   Comprehensive metabolic panel   Result Value Ref Range    Sodium 139 133 - 144 mmol/L    Potassium 4.5 3.4 - 5.3 mmol/L    Chloride 104 94 - 109 mmol/L    Carbon Dioxide 29 20 - 32 mmol/L    Anion Gap 6 3 - 14 mmol/L    Glucose 92 70 - 99 mg/dL      Comment:      Non Fasting    Urea Nitrogen 22 7 - 30 mg/dL    Creatinine 0.88 0.66 - 1.25 mg/dL    GFR Estimate >90 >60 mL/min/1.7m2      Comment:      Non  GFR Calc    GFR Estimate If Black >90 >60 mL/min/1.7m2      Comment:       GFR Calc    Calcium 9.2 8.5 - 10.1 mg/dL    Bilirubin Total 0.8 0.2 - 1.3 mg/dL    Albumin 4.3 3.4 -  5.0 g/dL    Protein Total 7.5 6.8 - 8.8 g/dL    Alkaline Phosphatase 52 40 - 150 U/L    ALT 26 0 - 70 U/L    AST 20 0 - 45 U/L   Lipid panel reflex to direct LDL Fasting   Result Value Ref Range    Cholesterol 141 <200 mg/dL    Triglycerides 111 <150 mg/dL      Comment:      Non Fasting    HDL Cholesterol 36 (L) >39 mg/dL    LDL Cholesterol Calculated 83 <100 mg/dL      Comment:      Desirable:       <100 mg/dl    Non HDL Cholesterol 105 <130 mg/dL   TSH with free T4 reflex   Result Value Ref Range    TSH 1.51 0.40 - 4.00 mU/L   HIV Antigen Antibody Combo   Result Value Ref Range    HIV Antigen Antibody Combo Nonreactive NR^Nonreactive          Comment:      HIV-1 p24 Ag & HIV-1/HIV-2 Ab Not Detected   Hepatitis B Surface Antibody   Result Value Ref Range    Hepatitis B Surface Antibody 116.83 (H) <8.00 m[IU]/mL      Comment:      Reactive, Patient is considered to be immune to infection with hepatitis B   when the value is greater than or equal to 12.0 m[IU]/mL.     Hepatitis B surface antigen   Result Value Ref Range    Hep B Surface Agn Nonreactive NR^Nonreactive   Hepatitis C Screen Reflex to HCV RNA Quant and Genotype   Result Value Ref Range    Hepatitis C Antibody Nonreactive NR^Nonreactive      Comment:      Assay performance characteristics have not been established for newborns,   infants, and children     Treponema Abs w Reflex to RPR and Titer   Result Value Ref Range    Treponema Antibodies Nonreactive NR^Nonreactive       If you have any questions or concerns, please call the clinic at the number listed above.       Sincerely,        Elaine Reed MD/nr

## 2018-06-04 NOTE — PROGRESS NOTES
Results within acceptable limits.  -Normal red blood cell (hgb) levels, normal white blood cell count and normal platelet levels.

## 2018-06-05 LAB
ALBUMIN SERPL-MCNC: 4.3 G/DL (ref 3.4–5)
ALP SERPL-CCNC: 52 U/L (ref 40–150)
ALT SERPL W P-5'-P-CCNC: 26 U/L (ref 0–70)
ANION GAP SERPL CALCULATED.3IONS-SCNC: 6 MMOL/L (ref 3–14)
AST SERPL W P-5'-P-CCNC: 20 U/L (ref 0–45)
BILIRUB SERPL-MCNC: 0.8 MG/DL (ref 0.2–1.3)
BUN SERPL-MCNC: 22 MG/DL (ref 7–30)
C TRACH DNA SPEC QL NAA+PROBE: NEGATIVE
CALCIUM SERPL-MCNC: 9.2 MG/DL (ref 8.5–10.1)
CHLORIDE SERPL-SCNC: 104 MMOL/L (ref 94–109)
CHOLEST SERPL-MCNC: 141 MG/DL
CO2 SERPL-SCNC: 29 MMOL/L (ref 20–32)
CREAT SERPL-MCNC: 0.88 MG/DL (ref 0.66–1.25)
GFR SERPL CREATININE-BSD FRML MDRD: >90 ML/MIN/1.7M2
GLUCOSE SERPL-MCNC: 92 MG/DL (ref 70–99)
HBV SURFACE AB SERPL IA-ACNC: 116.83 M[IU]/ML
HBV SURFACE AG SERPL QL IA: NONREACTIVE
HCV AB SERPL QL IA: NONREACTIVE
HDLC SERPL-MCNC: 36 MG/DL
HIV 1+2 AB+HIV1 P24 AG SERPL QL IA: NONREACTIVE
LDLC SERPL CALC-MCNC: 83 MG/DL
N GONORRHOEA DNA SPEC QL NAA+PROBE: NEGATIVE
NONHDLC SERPL-MCNC: 105 MG/DL
POTASSIUM SERPL-SCNC: 4.5 MMOL/L (ref 3.4–5.3)
PROT SERPL-MCNC: 7.5 G/DL (ref 6.8–8.8)
SODIUM SERPL-SCNC: 139 MMOL/L (ref 133–144)
SPECIMEN SOURCE: NORMAL
SPECIMEN SOURCE: NORMAL
T PALLIDUM AB SER QL: NONREACTIVE
TRIGL SERPL-MCNC: 111 MG/DL
TSH SERPL DL<=0.005 MIU/L-ACNC: 1.51 MU/L (ref 0.4–4)

## 2018-06-05 ASSESSMENT — ANXIETY QUESTIONNAIRES: GAD7 TOTAL SCORE: 4

## 2018-06-05 ASSESSMENT — PATIENT HEALTH QUESTIONNAIRE - PHQ9: SUM OF ALL RESPONSES TO PHQ QUESTIONS 1-9: 1

## 2018-06-05 NOTE — PROGRESS NOTES
Results within acceptable limits.  -Liver and gallbladder tests are normal. (ALT,AST, Alk phos, bilirubin), kidney function is normal (Cr, GFR), Sodium is normal, Potassium is normal, Calcium is normal, Glucose is normal (diabetes screening test).   -Cholesterol levels (LDL,HDL, Triglycerides) are normal.  ADVISE: rechecking in 1 year.  -TSH (thyroid stimulating hormone) level is normal which indicates normal thyroid function.  negative for syphilis.

## 2018-06-05 NOTE — PROGRESS NOTES
negative for Hep C, HIv and Hep b infection  Immune to hepatitis b likely from prior immunization which is good

## 2018-06-06 ENCOUNTER — RADIANT APPOINTMENT (OUTPATIENT)
Dept: ULTRASOUND IMAGING | Facility: CLINIC | Age: 28
End: 2018-06-06
Attending: FAMILY MEDICINE
Payer: COMMERCIAL

## 2018-06-06 DIAGNOSIS — R59.9 ENLARGED LYMPH NODES: ICD-10-CM

## 2018-06-06 NOTE — PROGRESS NOTES
Kaden Mr. Mondragon,  Your neck ultrasound shows no abnormal lumps or lymph nodes.  If you have any further concerns please do not hesitate to contact us by message, phone or making an appointment.  Have a good day   Dr Derek KRISHNA

## 2018-09-27 ENCOUNTER — TELEPHONE (OUTPATIENT)
Dept: OTHER | Facility: CLINIC | Age: 28
End: 2018-09-27

## 2019-12-08 ENCOUNTER — HEALTH MAINTENANCE LETTER (OUTPATIENT)
Age: 29
End: 2019-12-08

## 2021-01-10 ENCOUNTER — HEALTH MAINTENANCE LETTER (OUTPATIENT)
Age: 31
End: 2021-01-10

## 2021-10-23 ENCOUNTER — HEALTH MAINTENANCE LETTER (OUTPATIENT)
Age: 31
End: 2021-10-23

## 2022-02-12 ENCOUNTER — HEALTH MAINTENANCE LETTER (OUTPATIENT)
Age: 32
End: 2022-02-12

## 2022-04-25 ENCOUNTER — OFFICE VISIT (OUTPATIENT)
Dept: FAMILY MEDICINE | Facility: CLINIC | Age: 32
End: 2022-04-25
Payer: COMMERCIAL

## 2022-04-25 VITALS
WEIGHT: 177 LBS | BODY MASS INDEX: 27.78 KG/M2 | TEMPERATURE: 98.1 F | HEIGHT: 67 IN | DIASTOLIC BLOOD PRESSURE: 83 MMHG | SYSTOLIC BLOOD PRESSURE: 123 MMHG | OXYGEN SATURATION: 98 % | HEART RATE: 86 BPM

## 2022-04-25 DIAGNOSIS — R09.81 CONGESTION OF PARANASAL SINUS: ICD-10-CM

## 2022-04-25 DIAGNOSIS — Z87.09 HISTORY OF URI (UPPER RESPIRATORY INFECTION): ICD-10-CM

## 2022-04-25 DIAGNOSIS — F41.9 ANXIETY: ICD-10-CM

## 2022-04-25 DIAGNOSIS — F17.200 TOBACCO USE DISORDER: ICD-10-CM

## 2022-04-25 DIAGNOSIS — Z87.898 HISTORY OF NECK SWELLING: Primary | ICD-10-CM

## 2022-04-25 DIAGNOSIS — K02.9 DENTAL CAVITIES: ICD-10-CM

## 2022-04-25 DIAGNOSIS — R09.82 POST-NASAL DRIP: ICD-10-CM

## 2022-04-25 DIAGNOSIS — J30.81 ALLERGIC RHINITIS DUE TO ANIMAL HAIR AND DANDER: ICD-10-CM

## 2022-04-25 DIAGNOSIS — R12 HEART BURN: ICD-10-CM

## 2022-04-25 DIAGNOSIS — R09.A2 GLOBUS SENSATION: ICD-10-CM

## 2022-04-25 DIAGNOSIS — R22.0 LUMP ON FACE: ICD-10-CM

## 2022-04-25 DIAGNOSIS — R03.0 ELEVATED BLOOD PRESSURE READING WITHOUT DIAGNOSIS OF HYPERTENSION: ICD-10-CM

## 2022-04-25 DIAGNOSIS — R09.81 NASAL CONGESTION: ICD-10-CM

## 2022-04-25 LAB
ALBUMIN SERPL-MCNC: 4.1 G/DL (ref 3.4–5)
ALP SERPL-CCNC: 65 U/L (ref 40–150)
ALT SERPL W P-5'-P-CCNC: 25 U/L (ref 0–70)
ANION GAP SERPL CALCULATED.3IONS-SCNC: 4 MMOL/L (ref 3–14)
AST SERPL W P-5'-P-CCNC: 18 U/L (ref 0–45)
BASOPHILS # BLD AUTO: 0 10E3/UL (ref 0–0.2)
BASOPHILS NFR BLD AUTO: 0 %
BILIRUB SERPL-MCNC: 0.4 MG/DL (ref 0.2–1.3)
BUN SERPL-MCNC: 25 MG/DL (ref 7–30)
CALCIUM SERPL-MCNC: 9 MG/DL (ref 8.5–10.1)
CHLORIDE BLD-SCNC: 107 MMOL/L (ref 94–109)
CO2 SERPL-SCNC: 28 MMOL/L (ref 20–32)
CREAT SERPL-MCNC: 0.99 MG/DL (ref 0.66–1.25)
EOSINOPHIL # BLD AUTO: 0.3 10E3/UL (ref 0–0.7)
EOSINOPHIL NFR BLD AUTO: 5 %
ERYTHROCYTE [DISTWIDTH] IN BLOOD BY AUTOMATED COUNT: 11.8 % (ref 10–15)
GFR SERPL CREATININE-BSD FRML MDRD: >90 ML/MIN/1.73M2
GLUCOSE BLD-MCNC: 99 MG/DL (ref 70–99)
HCT VFR BLD AUTO: 43.2 % (ref 40–53)
HGB BLD-MCNC: 14.9 G/DL (ref 13.3–17.7)
IMM GRANULOCYTES # BLD: 0 10E3/UL
IMM GRANULOCYTES NFR BLD: 0 %
LYMPHOCYTES # BLD AUTO: 2.4 10E3/UL (ref 0.8–5.3)
LYMPHOCYTES NFR BLD AUTO: 36 %
MCH RBC QN AUTO: 30.7 PG (ref 26.5–33)
MCHC RBC AUTO-ENTMCNC: 34.5 G/DL (ref 31.5–36.5)
MCV RBC AUTO: 89 FL (ref 78–100)
MONOCYTES # BLD AUTO: 0.6 10E3/UL (ref 0–1.3)
MONOCYTES NFR BLD AUTO: 10 %
NEUTROPHILS # BLD AUTO: 3.3 10E3/UL (ref 1.6–8.3)
NEUTROPHILS NFR BLD AUTO: 49 %
PLATELET # BLD AUTO: 295 10E3/UL (ref 150–450)
POTASSIUM BLD-SCNC: 4 MMOL/L (ref 3.4–5.3)
PROT SERPL-MCNC: 7.5 G/DL (ref 6.8–8.8)
RBC # BLD AUTO: 4.85 10E6/UL (ref 4.4–5.9)
SODIUM SERPL-SCNC: 139 MMOL/L (ref 133–144)
TSH SERPL DL<=0.005 MIU/L-ACNC: 3.14 MU/L (ref 0.4–4)
WBC # BLD AUTO: 6.7 10E3/UL (ref 4–11)

## 2022-04-25 PROCEDURE — 36415 COLL VENOUS BLD VENIPUNCTURE: CPT | Performed by: FAMILY MEDICINE

## 2022-04-25 PROCEDURE — 99204 OFFICE O/P NEW MOD 45 MIN: CPT | Performed by: FAMILY MEDICINE

## 2022-04-25 PROCEDURE — 80050 GENERAL HEALTH PANEL: CPT | Performed by: FAMILY MEDICINE

## 2022-04-25 ASSESSMENT — ANXIETY QUESTIONNAIRES
GAD7 TOTAL SCORE: 2
5. BEING SO RESTLESS THAT IT IS HARD TO SIT STILL: NOT AT ALL
7. FEELING AFRAID AS IF SOMETHING AWFUL MIGHT HAPPEN: SEVERAL DAYS
2. NOT BEING ABLE TO STOP OR CONTROL WORRYING: NOT AT ALL
6. BECOMING EASILY ANNOYED OR IRRITABLE: NOT AT ALL
GAD7 TOTAL SCORE: 2
7. FEELING AFRAID AS IF SOMETHING AWFUL MIGHT HAPPEN: SEVERAL DAYS
3. WORRYING TOO MUCH ABOUT DIFFERENT THINGS: NOT AT ALL
GAD7 TOTAL SCORE: 2
1. FEELING NERVOUS, ANXIOUS, OR ON EDGE: SEVERAL DAYS
4. TROUBLE RELAXING: NOT AT ALL

## 2022-04-25 ASSESSMENT — PATIENT HEALTH QUESTIONNAIRE - PHQ9
10. IF YOU CHECKED OFF ANY PROBLEMS, HOW DIFFICULT HAVE THESE PROBLEMS MADE IT FOR YOU TO DO YOUR WORK, TAKE CARE OF THINGS AT HOME, OR GET ALONG WITH OTHER PEOPLE: NOT DIFFICULT AT ALL
SUM OF ALL RESPONSES TO PHQ QUESTIONS 1-9: 0
SUM OF ALL RESPONSES TO PHQ QUESTIONS 1-9: 0

## 2022-04-25 NOTE — RESULT ENCOUNTER NOTE
Kaden Mr. Mondragon,  Some of your results came back and are within acceptable limits. -Normal red blood cell (hgb) levels, normal white blood cell count and normal platelet levels.. If you have any further concerns please do not hesitate to contact us by message, phone or making an appointment.  Have a good day   Dr Derek KRISHNA

## 2022-04-25 NOTE — PROGRESS NOTES
Assessment & Plan     History of neck swelling  Exam benign. No lymphadenopathy noted, no mouth lesions noted. Small right facial possible lipoma noted. here today with chronic intermittent sensation of swollen glands since 2016, prior MRI and et referral and u/S in 2018 normal also with chronic intermittent post nasal drip, allergies, mild heart burn and anxiety may be contributing to symptoms and globus sensation. Recently recovered from a URI with left over sinus congestion and neg covid tests at home. Acute symptoms resolved. Has bene fully vaccinated against covid. Also has hx of smoking and dental cavities that may be contributing to reactive lymphadenopathy  Chronic symptoms of swelling in lymph nodes, prior work up negative, suspect due to combination of smoking, allergies, post nasal drip, recent Resp infection, dental cavities, heart burn etc  Lump right face suspect lipoma,   See ENT to evaluate symptoms  Call to make apt with dentist  Resolved URI and sinus symptoms  Currently covid testing not indicated   Some globus sensation in throat could be due to post nasal drip and heart burn  Try Flonase and zyrtec to help with post nasal drip.  Has seasonal allergies & guinea pig dander only. Consider referral to allergist if bothering much in the future. Currently not a big issue.   Freq small meals, avoid eating 3 hrs before bedtime  Continue with smoking cessation  BP elevated recheck better, anxiety playing a role  Referred to a Counsellor  Labs and cxr today to evaluate symptoms  Stop by  to clarify name on chart   Return for a preventive physical   - Otolaryngology Referral; Future    Lump on face  Lump right face suspect lipoma, See ENT to evaluate symptoms  - CBC with platelets and differential; Future  - Otolaryngology Referral; Future  - Dental Referral; Future    History of URI (upper respiratory infection)  Post-nasal drip  Nasal congestion  Congestion of paranasal sinus  Resolved URI  and sinus symptoms  Currently covid testing not indicated   May Try Flonase and zyrtec to help with post nasal drip  - CBC with platelets and differential; Future  - Comprehensive metabolic panel (BMP + Alb, Alk Phos, ALT, AST, Total. Bili, TP); Future  - Otolaryngology Referral; Future    Globus sensation  Some globus sensation in throat could be due to post nasal drip and heart burn  - CBC with platelets and differential; Future  - Comprehensive metabolic panel (BMP + Alb, Alk Phos, ALT, AST, Total. Bili, TP); Future  - TSH with free T4 reflex; Future  - XR Chest 2 Views; Future  - Otolaryngology Referral; Future    Allergic rhinitis due to animal hair and dander/ ( seasonal allergies ( guinea pig dander only)  Try Flonase and zyrtec to help with post nasal drip. Consider referral to allergist if bothering much in the future. Currently not a big issue.   - CBC with platelets and differential; Future  - Comprehensive metabolic panel (BMP + Alb, Alk Phos, ALT, AST, Total. Bili, TP); Future    Tobacco use disorder  Continue with smoking cessation. Counseling  to help with anxiety to reduce stress response of need for smoking  - CBC with platelets and differential; Future  - Comprehensive metabolic panel (BMP + Alb, Alk Phos, ALT, AST, Total. Bili, TP); Future  - XR Chest 2 Views; Future    Heart burn  Freq small meals, avoid eating 3 hrs before bedtime  - CBC with platelets and differential; Future  - Comprehensive metabolic panel (BMP + Alb, Alk Phos, ALT, AST, Total. Bili, TP); Future    Anxiety  - Adult Mental Health  Referral; Future    Dental cavities  - Dental Referral; Future    Elevated blood pressure reading without diagnosis of hypertension  Recheck better. Suspect from anxiety    Review of the result(s) of each unique test - diagnostics since 2018  Diagnosis or treatment significantly limited by social determinants of health - anxiety, not seen since 2018  Ordering of each unique test  48 minutes  "spent on the date of the encounter doing chart review, history and exam, documentation and further activities per the note     Tobacco Cessation:   reports that he has been smoking cigarettes. He started smoking about 12 years ago. He has never used smokeless tobacco.  Tobacco Cessation Action Plan: working on quitting    BMI:   Estimated body mass index is 27.72 kg/m  as calculated from the following:    Height as of this encounter: 1.702 m (5' 7\").    Weight as of this encounter: 80.3 kg (177 lb).   Weight management plan: Discussed healthy diet and exercise guidelines    Regular exercise  See Patient Instructions    Return in about 4 weeks (around 5/23/2022) for Follow up, Routine preventive, with me, in person.    Elaine Reed MD  [facility]   Gem Newman is a 31 year old who presents for the following health issues     HPI   Concern - congest   Onset: for a while   Description: stuffy nose, post nasal drip,   Intensity: mild  Progression of Symptoms:  same  Accompanying Signs & Symptoms: have a sinuses infection a week ago   Previous history of similar problem: no   Precipitating factors:        Worsened by: seasonal allergy dehydrated   Alleviating factors:        Improved by: nothing, meds over the counter to help   Therapies tried and outcome: None  Answers for HPI/ROS submitted by the patient on 4/25/2022  If you checked off any problems, how difficult have these problems made it for you to do your work, take care of things at home, or get along with other people?: Not difficult at all  PHQ9 TOTAL SCORE: 0  TATI 7 TOTAL SCORE: 2    BACKGROUND  31 yr Worked as a  since age 16 & lived in [facility]. Is a smoker, ( did cold turkey once and didn't go well, felt gum was disgusting. hesitant to take any meds), down form 1 ppd to 2 cig a day, Hx of allergic rhinitis given Flonase and epi pen in the past, had problems learning and ADHD treated with adderall " when was a child, history of anxiety, hx of radial fracture in 05 S/P ORIF, & tibial fracture treated a with cast all accidents either while skateboarding or playing hockey and clavicle fracture managed conservatively as an infant, hx of chicken pox, foot contusion, esophagitis, cannabis noted on UA in 2006, last seen in clinic in 2015 when had a URI. allergic to guinea pig dander noted in 3rd and 5th grade requiring an Epipen to have on hand, allergic to cephalosporins, sulfa,   Seen in 2016 for  sinus issues and left gland in upper throat was swollen , referred to ENT, seen at [facility], Dr Chapin, was told had a nasal polyp left nostril/ sinus on mri checked out fine, given few meds to take, didn't do anything, forgot about it, felt then lump freaking him out. Feels inflamed. No pain, can feel when swallows like something in throat. Was given Flonase and antibiotics at one point.  But didn't help. Also noted has 3 cavities 2 on right one on left side of mouth that need to be fixed, had no Dental insurance.  Seen only once first time in 2018 by this provider for a physical & concer about swollen glands but none seen. Suspected smoking, post nasal drainage and likely dental cavities were causing some reactive adenopathy. Advised Flonase, and referred to the allergist and dentist. Given Tdap. Cbc, CMP, lipids, TSH, HIV, Hep C, & B,  Syphilis, GC were normal and immune to Hep B. u/S neck did not show any concerning findings. MN  negative.     CURRENTLY  Not seen since 2018. Here as an acute visit for congestion and swollen lymph nodes. Has been vaccinated x 3 against covid  Since 2018 seen by dentist at the time, to return to them. Never did see the allergist. Not had any care with a doctor since 2018.   Symptoms similar to prior , on and off feel glands I neck under jaw swell up and then gets worried might have throat cancer  Felt swelling under left jaw & neck swollen a times and feels something back of  throat and was freaking him out that he has cancer. Sensation never goes away, since 2016. Becomes more aware of it off and on. Not necessarily hurting. Sometimes tingles. No rash noted.     Several weeks ago felt on both sides and then made this apt. Since then acuteness of right side symptoms subsided  1 week after that started with a sinus infection with headache, mild fever and really clogged up sinus's, was coughing at the time, took several covid tests none came back positive. Was out of work 4 days, then had milder symptoms couple days   Feels fine now. Still with chronic pnd, nasal congestion and sinus better but still there. Doesnt feel ill in any way .    Hx of seasonal allergies, flare up now and then nothing sig  Smoking cig , cutting back quite a bit try to slowly quit from 1ppd to 2 cig a day  Still with a few cavities , to see dentist , trying to get in    Currently No fever or chills, no headache or dizziness, no double or blurry vision, no facial pain, earache, sore throat, runny nose, no trouble hearing, smelling, tasting or swallowing, no cough, no chest pain, trouble breathing or palpitations, No abdominal pain, has mild heart burn, no reflux, nausea or vomiting or diarrhea or constipation, no blood in stools or black stools, no weight loss or night sweats. No dysuria, hematuria, frequency, urgency, hesitancy, incontinence, No pelvic complaints. No leg swelling or joint pain. No rash.    Noted a Lump right cheek noted 2 months ago , no pain,just felt while feeling face. No pain but feels pressure right there  Notices midlly when open close jaw and feels it moves in the skin, but not in face    Anxiety mild, worried about health   Still working as a     Name in chart erroneously showing his moms name as alias and preferred name tried to correct it . Will check at  before leaving    Review of Systems   Constitutional, HEENT, cardiovascular, pulmonary, GI, , musculoskeletal, neuro,  "skin, endocrine and psych systems are negative, except as otherwise noted.      Objective    /83 (BP Location: Right arm, Patient Position: Sitting, Cuff Size: Adult Large)   Pulse 86   Temp 98.1  F (36.7  C) (Temporal)   Ht 1.702 m (5' 7\")   Wt 80.3 kg (177 lb)   SpO2 98%   BMI 27.72 kg/m    Body mass index is 27.72 kg/m .  Physical Exam   GENERAL: healthy, alert and no distress  EYES: Eyes grossly normal to inspection, PERRL and conjunctivae and sclerae normal  HENT: normal cephalic/atraumatic, right ear: normal: no effusions, no erythema, normal landmarks, left ear: occluded with wax, nose and mouth without ulcers or lesions, nasal mucosa edematous , oropharynx clear, oral mucous membranes moist, sinuses: not tender and small lipoma like bb sized lump right face cheek over maxillary area  NECK: no adenopathy, no asymmetry, masses, or scars and thyroid normal to palpation  RESP: lungs clear to auscultation - no rales, rhonchi or wheezes  CV: regular rate and rhythm, normal S1 S2, no S3 or S4, no murmur, click or rub, no peripheral edema and peripheral pulses strong  ABDOMEN: soft, nontender, no hepatosplenomegaly, no masses and bowel sounds normal  MS: no gross musculoskeletal defects noted, no edema  SKIN: no suspicious lesions or rashes  NEURO: Normal strength and tone, mentation intact and speech normal  PSYCH: mentation appears normal, affect normal/bright, anxious, judgement and insight intact and appearance well groomed  LYMPH: no cervical, supraclavicular, axillary, or popliteal or inguinal adenopathy  LYMPH: normal ant/post cervical, supraclavicular nodes    No results found for this or any previous visit (from the past 24 hour(s)).          "

## 2022-04-25 NOTE — RESULT ENCOUNTER NOTE
Kaden  Hljanie,  Your results came back and are within acceptable limits. -Liver and gallbladder tests are normal (ALT,AST, Alk phos, bilirubin), kidney function is normal (Cr, GFR), sodium is normal, potassium is normal, calcium is normal, glucose is normal.  -TSH (thyroid stimulating hormone) level is normal which indicates normal thyroid function.. If you have any further concerns please do not hesitate to contact us by message, phone or making an appointment.  Have a good day   Dr Derek KRISHNA

## 2022-04-25 NOTE — PATIENT INSTRUCTIONS
Chronic symptoms of swelling in lymph nodes, prior owrk up negative, suspect due to combination of smoking, allergies, post nasal drip, recent Resp infection, dental cavities, heart burn etc  Lump right face suspect lipoma,   See ENT to evaluate symptoms  Call to make apt with dentist  Resolved URI and sinus symptoms  Currently covid testing not indicated   Some globus sensation in throat could be due to post nasal drip and heart burn  Try flonase and zyrtec to help with post nasal drip  Freq small meals, avoid eating 3 hrs before bedtime  Continue with smoking cessation  BP elevated recheck better  Referred to a counsellor   Labs and cxr today   Return for a preventive physical

## 2022-04-26 ASSESSMENT — ANXIETY QUESTIONNAIRES: GAD7 TOTAL SCORE: 2

## 2022-04-26 ASSESSMENT — PATIENT HEALTH QUESTIONNAIRE - PHQ9: SUM OF ALL RESPONSES TO PHQ QUESTIONS 1-9: 0

## 2022-05-06 NOTE — TELEPHONE ENCOUNTER
FUTURE VISIT INFORMATION      FUTURE VISIT INFORMATION:    Date: 5/23/22    Time: 9:40AM    [place] [facility] [place]:    Referring provider:  Elaine Reed MD    Referring providers clinic:  [facility]    Reason for visit/diagnosis  Neck swelling/lump referred by Elaine Reed MD in  FAMILY PRAC/IMPEDS    RECORDS REQUESTED FROM:       Clinic name Comments Records Status Imaging Status   [facility] 4/25/22 note and referral from Elaine Reed MD Epic    Imaging 6/6/18 US Head Neck  [facility] PACS   [facility] 1/21/16 note from Dr Abdirahman Chapin Scanned in Epic    [facility]   [facility] MRN: [id number]   2/16/16 CT Paranasal Sinuses Sent to scan 5/6/22 req 5/6/22 - PACS                 5/6/22 9:30AM sent a fax to [facility] for images - [facility]   5/13/22 2:13PM images received in PACS - [facility]

## 2022-05-13 NOTE — TELEPHONE ENCOUNTER
FUTURE VISIT INFORMATION      FUTURE VISIT INFORMATION:    Date: 7/27/22    Time: 9AM    [place] [facility] [place]:    Referring provider:  Elaine Reed MD    Referring providers clinic:  [facility]    Reason for visit/diagnosis  Nasal congestion, referred by Elaine Reed MD per    RECORDS REQUESTED FROM:       Clinic name Comments Records Status Imaging Status   [facility] 4/25/22 note and referral from Elaine Reed MD [facility]     Imaging 6/6/18 US Head Neck  [facility] PACS   [facility] 1/21/16 note from Dr Abdirahman Chapin Scanned in Epic     [facility]   [facility] MRN: [id number]    2/16/16 CT Paranasal Sinuses Sent to scan 5/6/22 req 5/6/22 - PACS

## 2022-05-23 ENCOUNTER — OFFICE VISIT (OUTPATIENT)
Dept: OTOLARYNGOLOGY | Facility: CLINIC | Age: 32
End: 2022-05-23
Payer: COMMERCIAL

## 2022-05-23 ENCOUNTER — PRE VISIT (OUTPATIENT)
Dept: OTOLARYNGOLOGY | Facility: CLINIC | Age: 32
End: 2022-05-23
Payer: COMMERCIAL

## 2022-05-23 VITALS
DIASTOLIC BLOOD PRESSURE: 79 MMHG | WEIGHT: 178 LBS | TEMPERATURE: 98.3 F | HEART RATE: 80 BPM | SYSTOLIC BLOOD PRESSURE: 129 MMHG | HEIGHT: 67 IN | BODY MASS INDEX: 27.94 KG/M2

## 2022-05-23 DIAGNOSIS — R09.81 NASAL CONGESTION: Primary | ICD-10-CM

## 2022-05-23 DIAGNOSIS — R22.0 FACIAL MASS: ICD-10-CM

## 2022-05-23 PROCEDURE — 99203 OFFICE O/P NEW LOW 30 MIN: CPT | Performed by: STUDENT IN AN ORGANIZED HEALTH CARE EDUCATION/TRAINING PROGRAM

## 2022-05-23 ASSESSMENT — PAIN SCALES - GENERAL: PAINLEVEL: NO PAIN (0)

## 2022-05-23 NOTE — PROGRESS NOTES
May 23, 2022      Elaine Reed M.D.   [facility]   [address] [place] [address]        Dear Dr. Reed,     I had the pleasure of meeting Mr. Mondragon today in clinic.    HISTORY OF PRESENT ILLNESS:  As you know, he is a pleasant 31-year-old male referred for several issues.  He has noticed intermittent lymphadenopathy in the left neck over the last several years.  He was previously seen by Dr. [facility]anez [facility] and he was not worried about this.  He has had an ultrasound to evaluate this most recently in 2018 that showed reactive lymph nodes.  The patient reports that the lymph nodes intermittently enlarged and then go back down to normal.  He is also over the last year or so notice some swelling in the right cheek.  He also is complaining of nasal congestion and seasonal allergies.  He has previously tried Flonase without much benefit and  he also uses over-the-counter antihistamines when his seasonal allergies are aggravated.    PAST MEDICAL HISTORY:  None.    PAST SURGICAL HISTORY:  Open reduction internal fixation of a forearm fracture.    MEDICATIONS:  None.    ALLERGIES:    1.  Cephalosporins.  2.  Sulfa drugs.    SOCIAL HISTORY:  He is a current smoker and smokes approximately five cigarettes per day.  He drinks alcohol occasionally.  No drug use.    FAMILY HISTORY:  None.    REVIEW OF SYSTEMS:  A 10-point review of system was performed and noted in the HPI.    PHYSICAL EXAMINATION:  He is alert, in no acute distress.  He has no palpable lymphadenopathy in the neck.  In the right cheek in the area where he feels a lump, I do not appreciate any discrete mass.  Initially, it felt like soft tissue that was slightly more firm, but clearly no masses are palpable.  Later on, he was feeling the anterior edge of the masseter muscle.  No lesions are seen in the oral cavity or oropharynx.    ASSESSMENT/PLAN: A 31-year-old male, overall looks well today  without any concern for malignant process and head and neck.  I think his complaint of intermittent enlargement of lymph nodes in the left neck is  likely benign based on the history.  I was not able to feel any abnormal lymph nodes today.  I was not able to palpate the mass in the right cheek that he is reporting today.  I discussed the limitations of examination with him and offered a CT scan of the neck, although I made it very clear that I think that the benefit of this is low and I do not think that anything significant will be found.  We spent some time today in the visit talking about treatment of seasonal and perennial allergies.  I reviewed with him that I would recommend Flonase to help with his nasal symptoms.  He will try this.  He can follow up with me as needed.  If he would like to discuss nasal congestion or sinus disease in more detail, he can be seen by general otolaryngology or rhinology.      Thank you for allowing me to participate in the care of this patient. If you have any further questions, please do not hesitate to contact me.      Sincerely,      Jonathan Ford M.D.      Head and Neck Surgical Oncology and Microvascular Reconstruction  Department of Otolaryngology - Head and Neck Surgery  [facility]        30 minutes spent on the date of the encounter in chart review, patient visit, review of tests, documentation and/or discussion with other providers about the issues documented above.

## 2022-05-23 NOTE — LETTER
5/23/2022       RE: Frandy Mondragon  [address]  [facility] [address]     Dear Colleague,    Thank you for referring your patient, Frandy Mondragon, to the [facility] [place] [facility]. Please see a copy of my visit note below.    May 23, 2022      Elaine Reed M.D.   [facility]   [address] [place], [place] [id number]        Dear Dr. Reed,     I had the pleasure of meeting Mr. Mondragon today in clinic.    HISTORY OF PRESENT ILLNESS:  As you know, he is a pleasant 31-year-old male referred for several issues.  He has noticed intermittent lymphadenopathy in the left neck over the last several years.  He was previously seen by Dr. Fontanez with [facility] and he was not worried about this.  He has had an ultrasound to evaluate this most recently in 2018 that showed reactive lymph nodes.  The patient reports that the lymph nodes intermittently enlarged and then go back down to normal.  He is also over the last year or so notice some swelling in the right cheek.  He also is complaining of nasal congestion and seasonal allergies.  He has previously tried Flonase without much benefit and  he also uses over-the-counter antihistamines when his seasonal allergies are aggravated.    PAST MEDICAL HISTORY:  None.    PAST SURGICAL HISTORY:  Open reduction internal fixation of a forearm fracture.    MEDICATIONS:  None.    ALLERGIES:    1.  Cephalosporins.  2.  Sulfa drugs.    SOCIAL HISTORY:  He is a current smoker and smokes approximately five cigarettes per day.  He drinks alcohol occasionally.  No drug use.    FAMILY HISTORY:  None.    REVIEW OF SYSTEMS:  A 10-point review of system was performed and noted in the HPI.    PHYSICAL EXAMINATION:  He is alert, in no acute distress.  He has no palpable lymphadenopathy in the neck.  In the right cheek in the area where he feels a lump, I do  not appreciate any discrete mass.  Initially, it felt like soft tissue that was slightly more firm, but clearly no masses are palpable.  Later on, he was feeling the anterior edge of the masseter muscle.  No lesions are seen in the oral cavity or oropharynx.    ASSESSMENT/PLAN: A 31-year-old male, overall looks well today without any concern for malignant process and head and neck.  I think his complaint of intermittent enlargement of lymph nodes in the left neck is  likely benign based on the history.  I was not able to feel any abnormal lymph nodes today.  I was not able to palpate the mass in the right cheek that he is reporting today.  I discussed the limitations of examination with him and offered a CT scan of the neck, although I made it very clear that I think that the benefit of this is low and I do not think that anything significant will be found.  We spent some time today in the visit talking about treatment of seasonal and perennial allergies.  I reviewed with him that I would recommend Flonase to help with his nasal symptoms.  He will try this.  He can follow up with me as needed.  If he would like to discuss nasal congestion or sinus disease in more detail, he can be seen by general otolaryngology or rhinology.      Thank you for allowing me to participate in the care of this patient. If you have any further questions, please do not hesitate to contact me.      Sincerely,      Jonathan Ford M.D.      Head and Neck Surgical Oncology and Microvascular Reconstruction  Department of Otolaryngology - Head and Neck Surgery  [facility]        30 minutes spent on the date of the encounter in chart review, patient visit, review of tests, documentation and/or discussion with other providers about the issues documented above.         Again, thank you for allowing me to participate in the care of your patient.      Sincerely,    Jonathan Ford MD

## 2022-05-23 NOTE — PATIENT INSTRUCTIONS
1. Please follow-up in clinic in as needed with Dr. Ford.  2. Please call the ENT clinic with any questions,concerns, new or worsening symptoms.    -Clinic number is [phone]   - Kerrie's direct line (Dr. Ford's nurse) [phone]

## 2022-07-19 ENCOUNTER — OFFICE VISIT (OUTPATIENT)
Dept: FAMILY MEDICINE | Facility: CLINIC | Age: 32
End: 2022-07-19
Payer: COMMERCIAL

## 2022-07-19 VITALS
BODY MASS INDEX: 26.93 KG/M2 | SYSTOLIC BLOOD PRESSURE: 110 MMHG | RESPIRATION RATE: 20 BRPM | HEART RATE: 64 BPM | TEMPERATURE: 98.1 F | OXYGEN SATURATION: 98 % | WEIGHT: 171.6 LBS | HEIGHT: 67 IN | DIASTOLIC BLOOD PRESSURE: 60 MMHG

## 2022-07-19 DIAGNOSIS — R12 HEART BURN: ICD-10-CM

## 2022-07-19 DIAGNOSIS — Z28.21 PNEUMOCOCCAL VACCINATION DECLINED: ICD-10-CM

## 2022-07-19 DIAGNOSIS — R09.82 POST-NASAL DRIP: ICD-10-CM

## 2022-07-19 DIAGNOSIS — R09.81 NASAL CONGESTION: ICD-10-CM

## 2022-07-19 DIAGNOSIS — Z00.00 ROUTINE HISTORY AND PHYSICAL EXAMINATION OF ADULT: Primary | ICD-10-CM

## 2022-07-19 DIAGNOSIS — J30.9 ALLERGIC RHINITIS, UNSPECIFIED SEASONALITY, UNSPECIFIED TRIGGER: ICD-10-CM

## 2022-07-19 DIAGNOSIS — R09.A2 GLOBUS SENSATION: ICD-10-CM

## 2022-07-19 DIAGNOSIS — Z71.89 ADVANCED DIRECTIVES, COUNSELING/DISCUSSION: ICD-10-CM

## 2022-07-19 DIAGNOSIS — R03.0 ELEVATED BLOOD PRESSURE READING WITHOUT DIAGNOSIS OF HYPERTENSION: ICD-10-CM

## 2022-07-19 DIAGNOSIS — F41.9 ANXIETY: ICD-10-CM

## 2022-07-19 DIAGNOSIS — Z13.6 ENCOUNTER FOR LIPID SCREENING FOR CARDIOVASCULAR DISEASE: ICD-10-CM

## 2022-07-19 DIAGNOSIS — D17.0 LIPOMA OF FACE: ICD-10-CM

## 2022-07-19 DIAGNOSIS — H57.10 PAIN IN EYE, UNSPECIFIED LATERALITY: ICD-10-CM

## 2022-07-19 DIAGNOSIS — Z86.16 HISTORY OF 2019 NOVEL CORONAVIRUS DISEASE (COVID-19): ICD-10-CM

## 2022-07-19 DIAGNOSIS — Z00.00 HEALTH CARE MAINTENANCE: ICD-10-CM

## 2022-07-19 DIAGNOSIS — Z13.220 ENCOUNTER FOR LIPID SCREENING FOR CARDIOVASCULAR DISEASE: ICD-10-CM

## 2022-07-19 DIAGNOSIS — K02.9 DENTAL CAVITIES: ICD-10-CM

## 2022-07-19 DIAGNOSIS — F17.200 TOBACCO USE DISORDER: ICD-10-CM

## 2022-07-19 LAB
CHOLEST SERPL-MCNC: 179 MG/DL
FASTING STATUS PATIENT QL REPORTED: NO
HDLC SERPL-MCNC: 39 MG/DL
LDLC SERPL CALC-MCNC: 119 MG/DL
NONHDLC SERPL-MCNC: 140 MG/DL
TRIGL SERPL-MCNC: 103 MG/DL

## 2022-07-19 PROCEDURE — 99213 OFFICE O/P EST LOW 20 MIN: CPT | Mod: 25 | Performed by: FAMILY MEDICINE

## 2022-07-19 PROCEDURE — 36415 COLL VENOUS BLD VENIPUNCTURE: CPT | Performed by: FAMILY MEDICINE

## 2022-07-19 PROCEDURE — 80061 LIPID PANEL: CPT | Performed by: FAMILY MEDICINE

## 2022-07-19 PROCEDURE — 99395 PREV VISIT EST AGE 18-39: CPT | Performed by: FAMILY MEDICINE

## 2022-07-19 ASSESSMENT — ENCOUNTER SYMPTOMS
SHORTNESS OF BREATH: 0
DIZZINESS: 0
ARTHRALGIAS: 0
COUGH: 0
PARESTHESIAS: 0
FEVER: 0
CONSTIPATION: 0
PALPITATIONS: 0
JOINT SWELLING: 0
FREQUENCY: 0
HEARTBURN: 0
HEADACHES: 0
DIARRHEA: 0
HEMATURIA: 0
WEAKNESS: 0
HEMATOCHEZIA: 0
NERVOUS/ANXIOUS: 0
DYSURIA: 0
NAUSEA: 0
EYE PAIN: 1
ABDOMINAL PAIN: 0
MYALGIAS: 0
SORE THROAT: 0

## 2022-07-19 ASSESSMENT — ANXIETY QUESTIONNAIRES
2. NOT BEING ABLE TO STOP OR CONTROL WORRYING: NOT AT ALL
4. TROUBLE RELAXING: NOT AT ALL
GAD7 TOTAL SCORE: 2
3. WORRYING TOO MUCH ABOUT DIFFERENT THINGS: SEVERAL DAYS
GAD7 TOTAL SCORE: 2
7. FEELING AFRAID AS IF SOMETHING AWFUL MIGHT HAPPEN: NOT AT ALL
6. BECOMING EASILY ANNOYED OR IRRITABLE: SEVERAL DAYS
GAD7 TOTAL SCORE: 2
7. FEELING AFRAID AS IF SOMETHING AWFUL MIGHT HAPPEN: NOT AT ALL
1. FEELING NERVOUS, ANXIOUS, OR ON EDGE: NOT AT ALL
5. BEING SO RESTLESS THAT IT IS HARD TO SIT STILL: NOT AT ALL
8. IF YOU CHECKED OFF ANY PROBLEMS, HOW DIFFICULT HAVE THESE MADE IT FOR YOU TO DO YOUR WORK, TAKE CARE OF THINGS AT HOME, OR GET ALONG WITH OTHER PEOPLE?: NOT DIFFICULT AT ALL

## 2022-07-19 ASSESSMENT — PATIENT HEALTH QUESTIONNAIRE - PHQ9
SUM OF ALL RESPONSES TO PHQ QUESTIONS 1-9: 2
10. IF YOU CHECKED OFF ANY PROBLEMS, HOW DIFFICULT HAVE THESE PROBLEMS MADE IT FOR YOU TO DO YOUR WORK, TAKE CARE OF THINGS AT HOME, OR GET ALONG WITH OTHER PEOPLE: NOT DIFFICULT AT ALL
SUM OF ALL RESPONSES TO PHQ QUESTIONS 1-9: 2

## 2022-07-19 NOTE — RESULT ENCOUNTER NOTE
Delanoyusuf Mr. Mondragon,  Your results came back showing  -LDL(bad) cholesterol level is elevated, HDL(good) cholesterol level is low which can increase your heart disease risk.  A diet high in fat and simple carbohydrates, genetics and being overweight can contribute to this. ADVISE: exercising 150 minutes of aerobic exercise per week (30 minutes for 5 days per week or 50 minutes for 3 days per week are options) and eating a low saturated fat/low carbohydrate diet are helpful to improve this. In 12 months, you should check your fasting cholesterol panel by scheduling a lab-only appointment.. If you have any further concerns please do not hesitate to contact us by message, phone or making an appointment.  Have a good day   Dr Derek KRISHNA

## 2022-07-19 NOTE — PATIENT INSTRUCTIONS
Seen for preventive health and additional concerns today   Self testicular check regularly   Labs today and will make further recommendations once reviewed  BP normal  Referred to a Counsellor for anxiety  Will also have Edson Cee from our clinic too    Heart burn currently not an issue. Diet and lifestyle important  Smoking cessation encouraged  If change mind about meds or referral to [place]  If allergic rhinitis get sworse can refer to allergist  Continue flonase and zyrtec as needed  Nasal congestion post nasal drip, globus sensation chronic and better  Can do ct neck if gets worse  Seen by ENT and no concerning findings noted    See dentist about dental cavities    Lipoma right face benign    Pain corner of eye recently could have bene due to dryness and allergies, may use lubricating drops    Recovered from cvovid recently     Health care maintenance review  Work on health care directives, given honoring choice st review  Consider pneumonia vaccine given smoking hx. Opted not to get today     Return in 1 yr for a preventive physical and sooner in an office visit for nay new concerns.    Preventive Health Recommendations  Male Ages 26 - 39    Yearly exam:             See your health care provider every year in order to  o   Review health changes.   o   Discuss preventive care.    o   Review your medicines if your doctor has prescribed any.  You should be tested each year for STDs (sexually transmitted diseases), if you re at risk.   After age 35, talk to your provider about cholesterol testing. If you are at risk for heart disease, have your cholesterol tested at least every 5 years.   If you are at risk for diabetes, you should have a diabetes test (fasting glucose).  Shots: Get a flu shot each year. Get a tetanus shot every 10 years.     Nutrition:  Eat at least 5 servings of fruits and vegetables daily.   Eat whole-grain bread, whole-wheat pasta and brown rice instead of white grains and rice.    Get adequate Calcium and Vitamin D.     Lifestyle  Exercise for at least 150 minutes a week (30 minutes a day, 5 days a week). This will help you control your weight and prevent disease.   Limit alcohol to one drink per day.   No smoking.   Wear sunscreen to prevent skin cancer.   See your dentist every six months for an exam and cleaning.

## 2022-07-19 NOTE — PROGRESS NOTES
SUBJECTIVE:   CC: Frandy Mondragon is an 31 year old male who presents for preventative health visit.     Patient has been advised of split billing requirements and indicates understanding: Yes  Healthy Habits:     Getting at least 3 servings of Calcium per day:  Yes    Bi-annual eye exam:  NO    Dental care twice a year:  NO    Sleep apnea or symptoms of sleep apnea:  Excessive snoring    Diet:  Regular (no restrictions)    Frequency of exercise:  2-3 days/week    Duration of exercise:  Less than 15 minutes    Taking medications regularly:  Not Applicable    Medication side effects:  Not applicable    PHQ-2 Total Score: 0    Additional concerns today:  No    BACKGROUND  31 yr gentleman,  since age 16, is a smoker, ( did cold turkey once and didn't go well, felt gum was disgusting. hesitant to take any meds), down from 1 ppd to 2 cig a day, Hx of allergic rhinitis, nasal congestion, post nasal drip, globus sensation, given Flonase and epi pen in the past, dental cavities, lipoma face, had problems learning and ADHD treated with adderall when was a child, history of anxiety, hx of heartburn/ esophagitis, hx of radial fracture in 05 S/P ORIF, & tibial fracture treated a with cast all accidents either while skateboarding or playing hockey and clavicle fracture managed conservatively as an infant,  foot contusion,  hx of chicken pox,cannabis noted on UA in 2006, hx of Covid 2022, , allergic to cephalosporins, sulfa,     Seen in clinic in 2015 when had a URI. allergic to guinea pig dander noted in 3rd and 5th grade requiring an Epipen to have on hand, Seen in 2016 for  sinus issues and left gland in upper throat was swollen , referred to ENT, seen at [facility] and [facility], Dr Chapin, was told had a nasal polyp left nostril/ sinus on mri checked out fine, given few meds to take, didn't do anything, forgot about it, felt then lump freaking him out. Felt inflamed. No pain but globus.  Was given Flonase and antibiotics at one  point.  But felt didn't help. Also noted had 3 cavities 2 on right one on left side of mouth that needed to be fixed, but had no dental insurance.  Seen first time in 2018 by this provider for a physical & concern about swollen glands but none seen. Suspected smoking, post nasal drainage and likely dental cavities were causing some reactive adenopathy. Advised Flonase, and referred to the allergist and dentist. Given Tdap. Cbc, CMP, lipids, TSH, HIV, Hep C, & B,  Syphilis, GC were normal and immune to Hep B. u/S neck did not show any concerning findings. [place]  negative.     Seen next 4/25/22 for history of neck swelling. Exam was benign. No lymphadenopathy noted, no mouth lesions noted. Had a small right facial possible lipoma. Noted a chronic intermittent sensation of swollen glands since 2016, prior MRI and ENT referral and u/S in 2018 had been normal, Also endorsed chronic intermittent post nasal drip, allergies, mild heart burn and anxiety may be contributing to symptoms and globus sensation. Had recently recovered from a URI with leftover sinus congestion and neg Covid tests at home. Acute symptoms had resolved. Had been fully vaccinated against Covid. Also had a hx of smoking and dental cavities that were likely contributing to reactive lymphadenopathy. Advised to try Flonase and zyrtec, referred to ENT, advised seeing the dentist and to consider referral to allergist if allergies bothering him more in the future. Advised Freq small meals, avoid eating 3 hrs before bedtime & encouraged smoking cessation. BP was elevated but recheck better, & felt anxiety was playing a role. Referred to a Counsellor. Labs and cxr done to evaluate symptoms & corrected name in chart at  & was to return later for a physical. Labs came back showing normal cbc, CMP, TSH & cxr was normal.   Seen by ENT on 5/23/22 and noted benign findings and advised a ct neck if remained worried and to continue Flonase & follow up as  needed with them    CURRENTLY  Here for a preventive physical  No  concerns  HM reviewed  No aCP on file. honoring choices given    BP wnl    Anxiety: diaz , phq reviewed, = 2 each, works five 13 hrs day , a lot of it is stress related to work, declines need for meds, ok to seek counseling.no meds. But had tried 2 in past , & felt was not worth the money. definitely does not want one with [employer] background.     Heart burn not an issue currently    Smoking: has cut down quit a bit now about 3 to 6 cig a day. [place] referral to  [place]. [place] smoke while at work. Didn't smoke for 3 weeks post Covid, went back to [facility] and later 3 days of stress was back to smoking    Allergies not as bad as in the spring. Has pnd and nose congestion that is a constant. Tried Flonase with zyrtec. Helped a little but not a noticeable amount, stopped taking as not bothering as much now. Will pass on an allergy refer for now    Globus sensation still kind of there, has had years of that. Gotten kind of used to it / numb about it, now only notices when things flare up now and then, better in last few weeks. Seen by ENT nothing concerning found  Told could do act of neck  But no pressing need    Dental cavities ongoing issue and hopes to go to dentist soon    Lipoma face right cheek reassurance given by ENT was benign     Noted last 2 weeks corner of eye itching causing mild discomfort. No redness or drainage or vision changes or jaw or temple pain.    Recovered from Covid no residual symptoms.    Declines pneumonia vaccine    Declines need for std testing    Today's PHQ-2 Score:   PHQ-2 ( 1999 Pfizer) 7/19/2022   Q1: Little interest or pleasure in doing things 0   Q2: Feeling down, depressed or hopeless 0   PHQ-2 Score 0   Q1: Little interest or pleasure in doing things Not at all   Q2: Feeling down, depressed or hopeless Not at all   PHQ-2 Score 0     Abuse: Current or Past(Physical, Sexual or Emotional)- No  Do you feel  safe in your environment? Yes    Social History     Tobacco Use     Smoking status: Current Every Day Smoker     Types: Cigarettes     Start date: 6/4/2009     Smokeless tobacco: Never Used   Substance Use Topics     Alcohol use: Yes     Comment: 0 to 7 / week      If you drink alcohol do you typically have >3 drinks per day or >7 drinks per week? No    Alcohol Use 7/19/2022   Prescreen: >3 drinks/day or >7 drinks/week? No   Prescreen: >3 drinks/day or >7 drinks/week? -     Last PSA: No results found for: PSA    Reviewed orders with patient. Reviewed health maintenance and updated orders accordingly - Yes  Lab work is in process  Labs reviewed in EPIC  BP Readings from Last 3 Encounters:   07/19/22 110/60   05/23/22 129/79   04/25/22 123/83    Wt Readings from Last 3 Encounters:   07/19/22 77.8 kg (171 lb 9.6 oz)   05/23/22 80.7 kg (178 lb)   04/25/22 80.3 kg (177 lb)                  Patient Active Problem List   Diagnosis     Allergic rhinitis     Anxiety     Tobacco use disorder     Past Surgical History:   Procedure Laterality Date     ZZHC OPEN TX RADIAL HEAD/NECK FRACTURE  10/28/05    ORIF of unstable fracture       Social History     Tobacco Use     Smoking status: Current Every Day Smoker     Types: Cigarettes     Start date: 6/4/2009     Smokeless tobacco: Never Used   Substance Use Topics     Alcohol use: Yes     Comment: 0 to 7 / week      Family History   Problem Relation Age of Onset     Hypothyroidism Mother      Family History Negative Father      Family History Negative Sister          Current Outpatient Medications   Medication Sig Dispense Refill     MULTIVITAMINS OR TABS 1 tab per day  0     Allergies   Allergen Reactions     Animal Dander      Primarily guinea pigs and has anaphylactic reaction     Cephalosporins      Ceclor     Sulfa Drugs      Recent Labs   Lab Test 07/19/22  1020 04/25/22  1031 06/04/18  1241   *  --  83   HDL 39*  --  36*   TRIG 103  --  111   ALT  --  25 26   CR  --   0.99 0.88   GFRESTIMATED  --  >90 >90   GFRESTBLACK  --   --  >90   POTASSIUM  --  4.0 4.5   TSH  --  3.14 1.51        Reviewed and updated as needed this visit by clinical staff   Tobacco  Allergies  Meds   Med Hx  Surg Hx  Fam Hx  Soc Hx          Reviewed and updated as needed this visit by Provider   Tobacco  Allergies  Meds   Med Hx  Surg Hx  Fam Hx  Soc Hx         Past Medical History:   Diagnosis Date     Allergic rhinitis, cause unspecified     [employer]     Attention deficit disorder     Problem list name updated by automated process. Provider to review     Attention deficit disorder without mention of hyperactivity      Closed fracture of lateral condyle of humerus infancy  L     H/O clavicle fracture      Other anxiety states      Other closed fractures of distal end of radius (alone) 10/05     Problems with learning      Tibia fracture     fractured skateboard and hockey , cast      Varicella without mention of complication       Past Surgical History:   Procedure Laterality Date     ZZHC OPEN TX RADIAL HEAD/NECK FRACTURE  10/28/05    ORIF of unstable fracture     OB History   No obstetric history on file.       Review of Systems   Constitutional: Negative for fever.   HENT: Positive for congestion. Negative for ear pain, hearing loss and sore throat.    Eyes: Positive for pain.   Respiratory: Negative for cough and shortness of breath.    Cardiovascular: Negative for palpitations and peripheral edema.   Gastrointestinal: Negative for abdominal pain, constipation, diarrhea, heartburn, hematochezia and nausea.   Genitourinary: Negative for dysuria, frequency, genital sores, hematuria, impotence, penile discharge and urgency.   Musculoskeletal: Negative for arthralgias, joint swelling and myalgias.   Skin: Negative for rash.   Neurological: Negative for dizziness, weakness, headaches and paresthesias.   Psychiatric/Behavioral: Negative for mood changes. The patient is not nervous/anxious.   "    OBJECTIVE:   /60 (BP Location: Left arm, Patient Position: Sitting, Cuff Size: Adult Regular)   Pulse 64   Temp 98.1  F (36.7  C) (Temporal)   Resp 20   Ht 1.71 m (5' 7.32\")   Wt 77.8 kg (171 lb 9.6 oz)   SpO2 98%   BMI 26.62 kg/m      Physical Exam  GENERAL: healthy, alert and no distress  EYES: Eyes grossly normal to inspection, PERRL and conjunctivae and sclerae normal, glasses  HENT: ear canals and TM's normal, some wax in ears, nose and mouth without ulcers or lesions  NECK: no adenopathy, no asymmetry, masses, or scars and thyroid normal to palpation  RESP: lungs clear to auscultation - no rales, rhonchi or wheezes  CV: regular rate and rhythm, normal S1 S2, no S3 or S4, no murmur, click or rub, no peripheral edema and peripheral pulses strong  ABDOMEN: soft, non tender, no hepatosplenomegaly, no masses and bowel sounds normal   (male): normal male genitalia without lesions or urethral discharge, no hernia  MS: no gross musculoskeletal defects noted, no edema  SKIN: no suspicious lesions or rashes  NEURO: Normal strength and tone, mentation intact and speech normal  PSYCH: mentation appears normal, affect normal/bright    Diagnostic Test Results:  Labs reviewed in Epic  No results found for this or any previous visit (from the past 24 hour(s)).  Results for orders placed or performed in visit on 07/19/22   Lipid Profile (Chol, Trig, HDL, LDL calc)     Status: Abnormal   Result Value Ref Range    Cholesterol 179 <200 mg/dL    Triglycerides 103 <150 mg/dL    Direct Measure HDL 39 (L) >=40 mg/dL    LDL Cholesterol Calculated 119 (H) <=100 mg/dL    Non HDL Cholesterol 140 (H) <130 mg/dL    Patient Fasting > 8hrs? No     Narrative    Cholesterol  Desirable:  <200 mg/dL    Triglycerides  Normal:  Less than 150 mg/dL  Borderline High:  150-199 mg/dL  High:  200-499 mg/dL  Very High:  Greater than or equal to 500 mg/dL    Direct Measure HDL  Female:  Greater than or equal to 50 mg/dL   Male:  " Greater than or equal to 40 mg/dL    LDL Cholesterol  Desirable:  <100mg/dL  Above Desirable:  100-129 mg/dL   Borderline High:  130-159 mg/dL   High:  160-189 mg/dL   Very High:  >= 190 mg/dL    Non HDL Cholesterol  Desirable:  130 mg/dL  Above Desirable:  130-159 mg/dL  Borderline High:  160-189 mg/dL  High:  190-219 mg/dL  Very High:  Greater than or equal to 220 mg/dL       ASSESSMENT/PLAN:       ICD-10-CM    1. Routine history and physical examination of adult  Z00.00 Lipid Profile (Chol, Trig, HDL, LDL calc)   2. Elevated blood pressure reading without diagnosis of hypertension  R03.0     now normal    3. Anxiety  F41.9 Adult Mental Health  Referral   4. Heart burn  R12     not an issue currently    5. Tobacco use disorder  F17.200 Lipid Profile (Chol, Trig, HDL, LDL calc)   6. Allergic rhinitis, unspecified seasonality, unspecified trigger  J30.9    7. Nasal congestion  R09.81    8. Post-nasal drip  R09.82    9. Globus sensation  R19.8    10. Dental cavities  K02.9    11. Lipoma of face  D17.0    12. Pain in eye, unspecified laterality  H57.10    13. History of 2019 novel coronavirus disease (COVID-19)  Z86.16     june 2022   14. Health care maintenance  Z00.00 REVIEW OF HEALTH MAINTENANCE PROTOCOL ORDERS   15. Advanced directives, counseling/discussion  Z71.89    16. Pneumococcal vaccination declined  Z28.21    17. Encounter for lipid screening for cardiovascular disease  Z13.220 Lipid Profile (Chol, Trig, HDL, LDL calc)    Z13.6      Seen for preventive health and additional concerns today   Self testicular check regularly   Labs today and will make further recommendations once reviewed  BP normal  Referred to a Counsellor for anxiety  Will also have Edson Cee from our clinic contact him too    Heart burn currently not an issue. Diet and lifestyle important  Smoking cessation encouraged  If changes mind about meds or referral to [place] quit partners to contact us  If allergic rhinitis gets worse  "can refer to an allergist  Continue Flonase and zyrtec as needed  Nasal congestion, post nasal drip, globus sensation chronic intermittent and better. Prior labs, u/S ENT work up & cxr negative. Can do ct neck if gets worse. Seen by ENT recently and no concerning findings noted    See dentist about dental cavities as may be contributing to above    Lipoma right face benign    Pain corner of left eye recently could have been due to dryness and allergies, may use lubricating drops. See eye doctor if recurs    Recovered from Covid recently     Health care maintenance review  To work on health care directives, given honoring choices to review  Consider pneumonia vaccine given smoking hx. Opted not to get today     Return in 1 yr for a preventive physical and sooner in an office visit for nay new concerns.    Patient has been advised of split billing requirements and indicates understanding: Yes    COUNSELING:   Reviewed preventive health counseling, as reflected in patient instructions       Regular exercise       Healthy diet/nutrition       Vision screening       Immunizations    Declined: Pneumococcal due to Other not sure           Alcohol Use        Safe sex practices/STD prevention       Consider lung cancer screening for ages 55-80 years (77 for Medicare) and 20 pack-year smoking history        One time pneumovax for smokers       The ASCVD Risk score (Jeff ERNESTO Jr., et al., 2013) failed to calculate for the following reasons:    The 2013 ASCVD risk score is only valid for ages 40 to 79       Advance Care Planning    Estimated body mass index is 26.62 kg/m  as calculated from the following:    Height as of this encounter: 1.71 m (5' 7.32\").    Weight as of this encounter: 77.8 kg (171 lb 9.6 oz).     Weight management plan: Discussed healthy diet and exercise guidelines    He reports that he has been smoking cigarettes. He started smoking about 13 years ago. He has never used smokeless tobacco.  Tobacco Cessation " Action Plan:   Information offered: Patient not interested at this time      Counseling Resources:  ATP IV Guidelines  Pooled Cohorts Equation Calculator  FRAX Risk Assessment  ICSI Preventive Guidelines  Dietary Guidelines for Americans, 2010  USDA's MyPlate  ASA Prophylaxis  Lung CA Screening    Elaine Reed MD  [facility]

## 2022-07-27 ENCOUNTER — PRE VISIT (OUTPATIENT)
Dept: OTOLARYNGOLOGY | Facility: CLINIC | Age: 32
End: 2022-07-27

## 2022-10-10 ENCOUNTER — HEALTH MAINTENANCE LETTER (OUTPATIENT)
Age: 32
End: 2022-10-10

## 2023-04-04 ENCOUNTER — OFFICE VISIT (OUTPATIENT)
Dept: URGENT CARE | Facility: URGENT CARE | Age: 33
End: 2023-04-04
Payer: COMMERCIAL

## 2023-04-04 VITALS
HEART RATE: 75 BPM | DIASTOLIC BLOOD PRESSURE: 85 MMHG | BODY MASS INDEX: 26.09 KG/M2 | TEMPERATURE: 99.5 F | SYSTOLIC BLOOD PRESSURE: 130 MMHG | OXYGEN SATURATION: 98 % | HEIGHT: 68 IN

## 2023-04-04 DIAGNOSIS — N50.812 PAIN IN LEFT TESTICLE: Primary | ICD-10-CM

## 2023-04-04 DIAGNOSIS — R35.0 URINE FREQUENCY: ICD-10-CM

## 2023-04-04 LAB
ALBUMIN UR-MCNC: NEGATIVE MG/DL
APPEARANCE UR: CLEAR
BILIRUB UR QL STRIP: NEGATIVE
COLOR UR AUTO: YELLOW
GLUCOSE UR STRIP-MCNC: NEGATIVE MG/DL
HGB UR QL STRIP: NEGATIVE
KETONES UR STRIP-MCNC: NEGATIVE MG/DL
LEUKOCYTE ESTERASE UR QL STRIP: NEGATIVE
NITRATE UR QL: NEGATIVE
PH UR STRIP: 7 [PH] (ref 5–7)
SP GR UR STRIP: 1.02 (ref 1–1.03)
UROBILINOGEN UR STRIP-ACNC: 0.2 E.U./DL

## 2023-04-04 PROCEDURE — 87491 CHLMYD TRACH DNA AMP PROBE: CPT | Performed by: FAMILY MEDICINE

## 2023-04-04 PROCEDURE — 81003 URINALYSIS AUTO W/O SCOPE: CPT | Performed by: FAMILY MEDICINE

## 2023-04-04 PROCEDURE — 87591 N.GONORRHOEAE DNA AMP PROB: CPT | Performed by: FAMILY MEDICINE

## 2023-04-04 PROCEDURE — 99214 OFFICE O/P EST MOD 30 MIN: CPT | Performed by: FAMILY MEDICINE

## 2023-04-04 NOTE — PROGRESS NOTES
Chief Complaint   Patient presents with     Urgent Care     Groin Pain     Pt in clinic to have eval for testicle pain.         Frandy was seen today for urgent care and groin pain.    Diagnoses and all orders for this visit:    Pain in left testicle  -     US Testicular & Scrotum w Doppler Ltd  -     UA Macro with Reflex to Micro and Culture - lab collect; Future  -     Chlamydia trachomatis PCR; Future  -     Neisseria gonorrhoeae PCR; Future    Urine frequency      Results for orders placed or performed in visit on 04/04/23   US Testicular & Scrotum w Doppler Ltd     Status: None    Narrative    EXAMINATION: US TESTICULAR AND SCROTAL, 4/5/2023 6:49 AM     COMPARISON: None.    HISTORY: Bilateral testicular discomfort    TECHNIQUE: The scrotum was scanned in standard fashion with  specialized ultrasound transducer(s) using grey scale, color Doppler,  and spectral flow  techniques.    Findings:  The testes demonstrate normal and symmetric echotexture and  vascularity. No evidence of a focal lesion.  The right testicle  measures 3.8 x 2.1 x 5.1 cm and the left measures 3.3 x 2.2 x 5.5 cm.  There is no evidence of torsion.    No hydrocele.    Bilateral varicoceles.    Both the right and left epididymis are within normal limits.      Impression    Impression:   1.  Bilateral varicoceles.  2.  Normal appearance of the testicles.    I have personally reviewed the examination and initial interpretation  and I agree with the findings.    REJI MOSQUERA DO         SYSTEM ID:  [id number]   UA Macro with Reflex to Micro and Culture - lab collect     Status: Normal    Specimen: Urine, Midstream   Result Value Ref Range    Color Urine Yellow Colorless, Straw, Light Yellow, Yellow    Appearance Urine Clear Clear    Glucose Urine Negative Negative mg/dL    Bilirubin Urine Negative Negative    Ketones Urine Negative Negative mg/dL    Specific Gravity Urine 1.020 1.003 - 1.035    Blood Urine Negative Negative    pH Urine 7.0 5.0 - 7.0     Protein Albumin Urine Negative Negative mg/dL    Urobilinogen Urine 0.2 0.2, 1.0 E.U./dL    Nitrite Urine Negative Negative    Leukocyte Esterase Urine Negative Negative    Narrative    Microscopic not indicated      detailed [employer] message sent to patient   Also left a generic message on phone     Frandy Mondragon is a 32 year old male who comes with testicular pain. On exam there is a normal lie and no clinical evidence of torsion. Ultrasound was ordered  aHe does have tenderness over the epididymis on exam, this could be related to either epididymitis or STD or varicocele.. I think kidney stone is less likely since he does have palpable pain to the testicle and there is no blood in his urine. There is no evidence of hernia on exam. I think strangulated or incarcerated hernia is less likely since he is not having any vomiting or stool changes. At this point we do have gonorrhea and chlamydia cultures pending. With the pain over the epididymis, I will wait for the result of the UA   If UA shows any abnormality would consider treating.He should not do any straining or lifting. The patient should follow up with urology if not improving in the next two to five days. He should return if increasing pain, swelling, fever, or other further concerns.         SUBJECTIVE  HPI:  Frandy Mondragon is a 32 year old male who presents with the CC of abdominal/pelvic pain.    Pain is located in the testicle area. Mild pain with heaviness and discomfort. Rating 2 on scale of 1-10.  Has been present for 1.5 week(s) and is stable.  EXACERBATING FACTORS: standing up  RELIEVING FACTORS: position sitting and lying down.  ASSOCIATED SX: urinary frequency.  He has been in a monogamous relationship for last 6 years is not concerned about any STDs at this point.  But has been noticing some urinary frequency.  Has been also constipated for last few days.  Patient denies any injury to the area.  R testicle is stable for the last couple years  "but L testicle is new in the last 1.5 weeks.    Denies coughing/ lifting making swelling worse. No abdominal or  symptoms.      History of swelling and pain in R testicle several years ago. Provided with referral but did not do this.   Appointment in a couple weeks.           Past Medical History:   Diagnosis Date     Allergic rhinitis, cause unspecified     [employer]     Attention deficit disorder     Problem list name updated by automated process. Provider to review     Attention deficit disorder without mention of hyperactivity      Closed fracture of lateral condyle of humerus infancy  L     H/O clavicle fracture      Other anxiety states      Other closed fractures of distal end of radius (alone) 10/05     Problems with learning      Tibia fracture     fractured skateboard and hockey , cast      Varicella without mention of complication      Current Outpatient Medications   Medication Sig Dispense Refill     MULTIVITAMINS OR TABS 1 tab per day  0     Social History     Tobacco Use     Smoking status: Every Day     Types: Cigarettes     Start date: 6/4/2009     Smokeless tobacco: Never   Vaping Use     Vaping status: Not on file   Substance Use Topics     Alcohol use: Yes     Comment: 0 to 7 / week        ROS:  10 point ROS of systems including Constitutional, Eyes, Respiratory, Cardiovascular, Gastroenterology,  Integumentary, Muscularskeletal, Psychiatric were all negative except for pertinent positives noted in my HPI           OBJECTIVE:  /85   Pulse 75   Temp 99.5  F (37.5  C) (Temporal)   Ht 1.727 m (5' 8\")   SpO2 98%   BMI 26.09 kg/m    GENERAL APPEARANCE: healthy, alert and no distress  EYES: EOMI,  PERRL, conjunctiva clear  ABDOMEN:  soft, nontender, no HSM   GU_male: testicles normal without  masses or  tenderness, no hernias and penis normal without urethral discharge  Except there was mild tenderness noted in the posterior aspect of the right testicle  PSYCH: mentation appears " normal      Kati Tijerina MD

## 2023-04-05 ENCOUNTER — MYC MEDICAL ADVICE (OUTPATIENT)
Dept: URGENT CARE | Facility: URGENT CARE | Age: 33
End: 2023-04-05

## 2023-04-05 ENCOUNTER — ANCILLARY PROCEDURE (OUTPATIENT)
Dept: ULTRASOUND IMAGING | Facility: CLINIC | Age: 33
End: 2023-04-05
Attending: FAMILY MEDICINE
Payer: COMMERCIAL

## 2023-04-05 DIAGNOSIS — N50.812 PAIN IN LEFT TESTICLE: Primary | ICD-10-CM

## 2023-04-05 LAB
C TRACH DNA SPEC QL NAA+PROBE: NEGATIVE
N GONORRHOEA DNA SPEC QL NAA+PROBE: NEGATIVE

## 2023-04-05 PROCEDURE — 76870 US EXAM SCROTUM: CPT | Mod: GC | Performed by: RADIOLOGY

## 2023-04-06 NOTE — TELEPHONE ENCOUNTER
Please see patient's response. Pt was seen in UC and is requesting a referral    Thank you  Yudelka Guadarrama/Sobia-  [facility]

## 2023-04-07 NOTE — TELEPHONE ENCOUNTER
"Dr. Reed --    Please review and advise.     Patient would like a referral to urology. Patient was seen in UC for pain in left testicle.   PENDED referral if you agree.     UC: \"The patient should follow up with urology if not improving in the next two to five days. He should return if increasing pain, swelling, fever, or other further concerns.\"    Patient states that left testicle is still bothering him.     LACI LewisN RN  [facility]    "

## 2023-04-18 ENCOUNTER — OFFICE VISIT (OUTPATIENT)
Dept: INTERNAL MEDICINE | Facility: CLINIC | Age: 33
End: 2023-04-18
Payer: COMMERCIAL

## 2023-04-18 VITALS
BODY MASS INDEX: 24.36 KG/M2 | RESPIRATION RATE: 13 BRPM | HEART RATE: 85 BPM | DIASTOLIC BLOOD PRESSURE: 72 MMHG | OXYGEN SATURATION: 98 % | SYSTOLIC BLOOD PRESSURE: 106 MMHG | WEIGHT: 160.2 LBS | TEMPERATURE: 99 F

## 2023-04-18 DIAGNOSIS — N50.819 PAIN IN TESTICLE, UNSPECIFIED LATERALITY: Primary | ICD-10-CM

## 2023-04-18 DIAGNOSIS — K64.9 HEMORRHOIDS, UNSPECIFIED HEMORRHOID TYPE: ICD-10-CM

## 2023-04-18 DIAGNOSIS — L98.9 SKIN LESION: ICD-10-CM

## 2023-04-18 PROCEDURE — 99213 OFFICE O/P EST LOW 20 MIN: CPT | Performed by: INTERNAL MEDICINE

## 2023-04-18 RX ORDER — LEVOFLOXACIN 500 MG/1
500 TABLET, FILM COATED ORAL DAILY
Qty: 10 TABLET | Refills: 0 | Status: CANCELLED | OUTPATIENT
Start: 2023-04-18 | End: 2023-04-28

## 2023-04-18 NOTE — PROGRESS NOTES
"  Assessment & Plan     Pain in testicle, unspecified laterality  [place] with patient that since he has had ongoing symptoms for about a year he might be best suited to see one of the urologist.  It is certainly reassuring that his testicular ultrasound, urinalysis and GC and Chlamydia studies are negative  - Adult Urology  Referral; Future    Hemorrhoids, unspecified hemorrhoid type  Suspect mild internal hemorrhoid.  No focal changes on exam of major concern.  Suggest encouraging hydration, fiber supplements and following up if symptoms persist    Skin lesion  No focal skin lesion noted on exam.  Patient was reassured.  He worries a lot.  We did discuss imaging of his facial area and neck area via CT scan of his soft tissue but at the present time based on history and exam this is of likely low yield.         BMI:   Estimated body mass index is 24.36 kg/m  as calculated from the following:    Height as of 4/4/23: 1.727 m (5' 8\").    Weight as of this encounter: 72.7 kg (160 lb 3.2 oz).       See Patient Instructions    Breezy Hickmna MD  [facility]    Flaco Wise is a 32 year old, presenting for the following health issues:  Testicular/scrotal Pain    HPI     Patient is a new patient to me having never been seen prior.    ED/UC Followup:    Facility:  [facility]   Date of visit: 4/4/23  Reason for visit: Pain in left testicle, urine frequency   Current Status: US completed that showed varicocele. Patient has questions regarding results.     He denies any other major complaints.  His urinalysis and chlamydia and gonorrhea screens were found to be negative.  His ultrasound was essentially negative less a varicocele.  He reports he has had the symptoms intermittently for about a year.    Other concerns:  1. Concerns about possible hemorrhoids.  States he does not drink enough fluid and was constipated recently.  He is wondering whether or not he may have had a " hemorrhoid.  He has not noticed any current or recent symptoms.  He states at 1 point he may have noticed a little bit of bright red blood around his rectal area but this has since resolved.  2. Facial mass on right side. Patient was seen by ENT 5/23/22. Continues to have discomfort in area.  He points to an area of his right cheek.  He was seen by a provider in the ENT clinic who did could not find anything of obvious concern.      Review of Systems   CONSTITUTIONAL: NEGATIVE for fever, chills, change in weight  ENT/MOUTH: NEGATIVE for ear, mouth and throat problems  RESP: NEGATIVE for significant cough or SOB  CV: NEGATIVE for chest pain, palpitations or peripheral edema  GI: NEGATIVE for nausea, abdominal pain, heartburn.  : NEGATIVE for frequency, dysuria, or hematuria  MUSCULOSKELETAL: NEGATIVE for significant arthralgias or myalgia  NEURO: NEGATIVE for weakness, dizziness or paresthesias  ENDOCRINE: NEGATIVE for temperature intolerance, skin/hair changes  HEME: NEGATIVE for bleeding problems  PSYCHIATRIC: NEGATIVE for changes in mood or affect      Objective    /72   Pulse 85   Temp 99  F (37.2  C) (Temporal)   Resp 13   Wt 72.7 kg (160 lb 3.2 oz)   SpO2 98%   BMI 24.36 kg/m    Body mass index is 24.36 kg/m .  Physical Exam   GENERAL: alert and no distress  EYES: Eyes grossly normal to inspection, PERRL and conjunctivae and sclerae normal  HENT: ear canals and TM's normal, nose and mouth without ulcers or lesions  NECK: no adenopathy, no asymmetry, masses, or scars and thyroid normal to palpation.  There are no significant focal changes noted in the area of concern that the patient refers to which is his right cheek and masseter area.  I cannot appreciate any significant or palpable lymph nodes of significance.  RESP: lungs clear to auscultation - no rales, rhonchi or wheezes  CV: regular rate and rhythm, normal S1 S2, no S3 or S4, no  click or rub   (male): normal male genitalia without  lesions or urethral discharge, no hernia.  Varicocele is noted.  Scrotum is not tense or inflamed.  There is no cellulitic change.  No penile lesions.  Digital rectal exam demonstrates no focal external hemorrhoids.  There is normal rectal tone.  Stool is brown and negative for blood.  Potentially small internal hemorrhoid is noted at about the 7 o'clock position.  There is no rectal fissure.  NEURO: No focal changes  PSYCH: mentation appears normal, affect normal/bright

## 2023-05-17 NOTE — TELEPHONE ENCOUNTER
MEDICAL RECORDS REQUEST   [facility]  Urology Clinic  [address] [place] [address]  PHONE: [phone]  Fax: [phone]        FUTURE VISIT INFORMATION                                                   Frandy Mondragon, : 1990 scheduled for future visit at [facility]    APPOINTMENT INFORMATION:    Date: 23    Provider:  Beka Soares    Reason for Visit/Diagnosis: testicular pain    REFERRAL INFORMATION:    Referring provider:  Breezy Hickman MD     Specialty: Internal Med  Referring providers clinic:  [facility]    RECORDS REQUESTED FOR VISIT                                                     NOTES  STATUS/DETAILS   OFFICE NOTE from referring provider  yes OV 23 Breezy Hickman MD   MEDICATION LIST  yes   LABS     URINALYSIS (UA)  yes 23   IMAGING (IMAGES & REPORT)  yes US Testicular 23     PRE-VISIT CHECKLIST      Record collection complete Yes

## 2023-05-23 ENCOUNTER — PRE VISIT (OUTPATIENT)
Dept: UROLOGY | Facility: CLINIC | Age: 33
End: 2023-05-23
Payer: COMMERCIAL

## 2023-05-23 NOTE — TELEPHONE ENCOUNTER
Reason for visit: Consult    Relevant information: Testicular pain, frequent urination    Records/imaging/labs/orders: Epic    At Rooming: Richard Davenport  5/23/2023  9:51 AM

## 2023-05-26 NOTE — PROGRESS NOTES
"Subjective     REQUESTING PROVIDER  Dr. Breezy Souza MD    REASON FOR VISIT  Testicular pain    HISTORY OF PRESENT ILLNESS  Mr. Mondragon is a pleasant 32 year old male with a past medical history significant for anxiety and tobacco use who presents today for discussion of his left testicular pain and urinary frequency.     Today:    Last 6 months have been particularly stressful    Multiyear history of known varicocele, but no pain historically    Pain started 2 months ago, discomfort started in the groin/rectum and seemed to \"settle\" in the left testicle    No dysuria, gross hematuria, history of kidney stones, or history of retention     No history of sti, UTI, or prostatits     No history of groin trauma    First noticed right varicocele and some right testicular discomfort years ago after starting to work out hard with lots of lifting and jumping    REVIEW OF SYSTEMS  Review of Systems   Constitutional: Positive for unexpected weight change (Associated with large break-up). Negative for activity change (Walking quite a bit more now).   HENT: Negative for ear pain and tinnitus.    Eyes: Negative for visual disturbance.   Respiratory: Negative for shortness of breath.    Cardiovascular: Negative for chest pain.   Gastrointestinal: Negative for abdominal pain and constipation.   Genitourinary: Negative for hematuria.   Musculoskeletal: Negative for back pain.   Neurological: Negative for dizziness and light-headedness.   Hematological: Positive for adenopathy (Chronic left sided swollen lymph node in the neck, seen by ENT historically).   Psychiatric/Behavioral: Negative for sleep disturbance.     Social History:  Current smoker, 14 years x 0.25    Family History:  Denies any known family history of urologic malignancy     Objective     PHYSICAL EXAM  Physical Exam  Constitutional:       Appearance: Normal appearance.   HENT:      Head: Normocephalic and atraumatic.      Right Ear: External ear normal.      Left " Ear: External ear normal.      Nose: Nose normal.   Eyes:      General:         Right eye: No discharge.         Left eye: No discharge.   Pulmonary:      Effort: Pulmonary effort is normal. No respiratory distress.   Abdominal:      General: There is no distension.   Genitourinary:     Comments: Circumcised phallus with no evidence of abnormal lesion, Peyronie's plaque within the penile shaft or meatal stenosis.    Bilateral testicles palpated with no evidence of nodularity, induration, or evidence of hydrocele or epididymal cyst.  Right testicle with mild tenderness to palpation.  Bilateral spermatic cords palpated with no evidence of vasectomy defect or any clinical varicoceles  Musculoskeletal:         General: No deformity.   Neurological:      Mental Status: He is alert and oriented to person, place, and time.   Psychiatric:         Mood and Affect: Mood normal.       LABORATORY   Latest Reference Range & Units 04/04/23 14:43   Color Urine Colorless, Straw, Light Yellow, Yellow  Yellow   Appearance Urine Clear  Clear   Glucose Urine Negative mg/dL Negative   Bilirubin Urine Negative  Negative   Ketones Urine Negative mg/dL Negative   Specific Gravity Urine 1.003 - 1.035  1.020   pH Urine 5.0 - 7.0  7.0   Protein Albumin Urine Negative mg/dL Negative   Urobilinogen Urine 0.2, 1.0 E.U./dL 0.2   Nitrite Urine Negative  Negative   Blood Urine Negative  Negative   Leukocyte Esterase Urine Negative  Negative     STI testing associated with above urinalysis with no significant results    IMAGING  I personally reviewed and interpreted the below testicular ultrasound from 4/5/23    Impression:   1.  Bilateral varicoceles.  2.  Normal appearance of the testicles    Assessment & Plan   1. Pelvic floor tension myalgia  2. Varicoceles  3. Rectal pain    It was my pleasure to meet with Mr. Mondragon in the office today in regards to his recent history of groin pain, rectal pain, and varicoceles.  After reviewing his clinical  history as well as the fact that he has been undergoing quite an elevated amount of stress in the last 6 months, we discussed that the most likely etiology of his testicular pain, overall groin pain, and rectal pain is a flareup of pelvic floor tension myalgia.  We discussed the natural history of pelvic floor dysfunction, as well as how it is directly related to stress or trauma.  We then reviewed the fact that the last 6 months of his life has been filled with quite a bit of stress, and that he feels that the symptoms have been getting better not worse.  With all of this in mind, we discussed that his 2 options would include observation given that the symptoms have not been getting worse, and that they may be improving, versus pursuing a referral to pelvic floor physical therapy.  At this time, Frandy would prefer observation.    In regards to his varicoceles, we also discussed the natural history of varicoceles, and specifically discussed the fact that I believe these varicoceles are subclinical given that they are difficult to palpate on physical exam and there does not appear to be any change with Valsalva.  With this in mind, these are nothing that need to be intervened on and nothing that he needs to be concerned about as varicoceles are not dangerous.    Mr. Mondragon expressed understanding and agreement to the above discussion and plan and all of his questions were answered to his satisfaction.      PLAN    Observation of varicoceles and resolving pelvic floor tension myalgia    Follow-up with urology as needed    Signed by:    Beka Soares PA-C    I spent a total of 33 minutes spent on the date of the encounter doing chart review, history and exam, documentation, and further activities as noted above.

## 2023-05-30 ENCOUNTER — OFFICE VISIT (OUTPATIENT)
Dept: UROLOGY | Facility: CLINIC | Age: 33
End: 2023-05-30
Payer: COMMERCIAL

## 2023-05-30 ENCOUNTER — PRE VISIT (OUTPATIENT)
Dept: UROLOGY | Facility: CLINIC | Age: 33
End: 2023-05-30

## 2023-05-30 VITALS
BODY MASS INDEX: 24.25 KG/M2 | HEIGHT: 68 IN | DIASTOLIC BLOOD PRESSURE: 77 MMHG | WEIGHT: 160 LBS | HEART RATE: 77 BPM | SYSTOLIC BLOOD PRESSURE: 111 MMHG

## 2023-05-30 DIAGNOSIS — N50.819 PAIN IN TESTICLE, UNSPECIFIED LATERALITY: ICD-10-CM

## 2023-05-30 DIAGNOSIS — N50.812 PAIN IN LEFT TESTICLE: ICD-10-CM

## 2023-05-30 PROCEDURE — 99243 OFF/OP CNSLTJ NEW/EST LOW 30: CPT | Performed by: STUDENT IN AN ORGANIZED HEALTH CARE EDUCATION/TRAINING PROGRAM

## 2023-05-30 ASSESSMENT — ENCOUNTER SYMPTOMS
CONSTIPATION: 0
ABDOMINAL PAIN: 0
LIGHT-HEADEDNESS: 0
BACK PAIN: 0
SLEEP DISTURBANCE: 0
HEMATURIA: 0
DIZZINESS: 0
ADENOPATHY: 1
UNEXPECTED WEIGHT CHANGE: 1
ACTIVITY CHANGE: 0
SHORTNESS OF BREATH: 0

## 2023-05-30 ASSESSMENT — PAIN SCALES - GENERAL: PAINLEVEL: NO PAIN (0)

## 2023-05-30 NOTE — NURSING NOTE
"Chief Complaint   Patient presents with     Consult For     Pt states he had a bilateral varicocele. Testicular pain         Blood pressure 111/77, pulse 77, height 1.727 m (5' 8\"), weight 72.6 kg (160 lb). Body mass index is 24.33 kg/m .    Patient Active Problem List   Diagnosis     Allergic rhinitis     Anxiety     Tobacco use disorder       Allergies   Allergen Reactions     Animal Dander      Primarily guinea pigs and has anaphylactic reaction     Cephalosporins      Ceclor     Sulfa Antibiotics        Current Outpatient Medications   Medication Sig Dispense Refill     MULTIVITAMINS OR TABS 1 tab per day  0       Social History     Tobacco Use     Smoking status: Every Day     Types: Cigarettes     Start date: 6/4/2009     Smokeless tobacco: Never   Substance Use Topics     Alcohol use: Yes     Comment: 0 to 7 / week      Drug use: No       Ashley Bonilla  5/30/2023  7:05 AM     "

## 2023-05-30 NOTE — LETTER
"5/30/2023       RE: Frandy Mondragon  [address]  [facility] [address]     Dear Colleague,    Thank you for referring your patient, Frandy Mondragon, to the [facility] [place] at [facility]. Please see a copy of my visit note below.    Subjective     REQUESTING PROVIDER  Dr. Breezy Souza MD    REASON FOR VISIT  Testicular pain    HISTORY OF PRESENT ILLNESS  Mr. Mondragon is a pleasant 32 year old male with a past medical history significant for anxiety and tobacco use who presents today for discussion of his left testicular pain and urinary frequency.     Today:  Last 6 months have been particularly stressful  Multiyear history of known varicocele, but no pain historically  Pain started 2 months ago, discomfort started in the groin/rectum and seemed to \"settle\" in the left testicle  No dysuria, gross hematuria, history of kidney stones, or history of retention   No history of sti, UTI, or prostatits   No history of groin trauma  First noticed right varicocele and some right testicular discomfort years ago after starting to work out hard with lots of lifting and jumping    REVIEW OF SYSTEMS  Review of Systems   Constitutional: Positive for unexpected weight change (Associated with large break-up). Negative for activity change (Walking quite a bit more now).   HENT: Negative for ear pain and tinnitus.    Eyes: Negative for visual disturbance.   Respiratory: Negative for shortness of breath.    Cardiovascular: Negative for chest pain.   Gastrointestinal: Negative for abdominal pain and constipation.   Genitourinary: Negative for hematuria.   Musculoskeletal: Negative for back pain.   Neurological: Negative for dizziness and light-headedness.   Hematological: Positive for adenopathy (Chronic left sided swollen lymph node in the neck, seen by ENT historically).   Psychiatric/Behavioral: Negative for sleep disturbance.     Social " History:  Current smoker, 14 years x 0.25    Family History:  Denies any known family history of urologic malignancy     Objective     PHYSICAL EXAM  Physical Exam  Constitutional:       Appearance: Normal appearance.   HENT:      Head: Normocephalic and atraumatic.      Right Ear: External ear normal.      Left Ear: External ear normal.      Nose: Nose normal.   Eyes:      General:         Right eye: No discharge.         Left eye: No discharge.   Pulmonary:      Effort: Pulmonary effort is normal. No respiratory distress.   Abdominal:      General: There is no distension.   Genitourinary:     Comments: Circumcised phallus with no evidence of abnormal lesion, Peyronie's plaque within the penile shaft or meatal stenosis.    Bilateral testicles palpated with no evidence of nodularity, induration, or evidence of hydrocele or epididymal cyst.  Right testicle with mild tenderness to palpation.  Bilateral spermatic cords palpated with no evidence of vasectomy defect or any clinical varicoceles  Musculoskeletal:         General: No deformity.   Neurological:      Mental Status: He is alert and oriented to person, place, and time.   Psychiatric:         Mood and Affect: Mood normal.       LABORATORY   Latest Reference Range & Units 04/04/23 14:43   Color Urine Colorless, Straw, Light Yellow, Yellow  Yellow   Appearance Urine Clear  Clear   Glucose Urine Negative mg/dL Negative   Bilirubin Urine Negative  Negative   Ketones Urine Negative mg/dL Negative   Specific Gravity Urine 1.003 - 1.035  1.020   pH Urine 5.0 - 7.0  7.0   Protein Albumin Urine Negative mg/dL Negative   Urobilinogen Urine 0.2, 1.0 E.U./dL 0.2   Nitrite Urine Negative  Negative   Blood Urine Negative  Negative   Leukocyte Esterase Urine Negative  Negative     STI testing associated with above urinalysis with no significant results    IMAGING  I personally reviewed and interpreted the below testicular ultrasound from 4/5/23    Impression:   1.  Bilateral  varicoceles.  2.  Normal appearance of the testicles    Assessment & Plan   Pelvic floor tension myalgia  Varicoceles  Rectal pain    It was my pleasure to meet with Mr. Mondragon in the office today in regards to his recent history of groin pain, rectal pain, and varicoceles.  After reviewing his clinical history as well as the fact that he has been undergoing quite an elevated amount of stress in the last 6 months, we discussed that the most likely etiology of his testicular pain, overall groin pain, and rectal pain is a flareup of pelvic floor tension myalgia.  We discussed the natural history of pelvic floor dysfunction, as well as how it is directly related to stress or trauma.  We then reviewed the fact that the last 6 months of his life has been filled with quite a bit of stress, and that he feels that the symptoms have been getting better not worse.  With all of this in mind, we discussed that his 2 options would include observation given that the symptoms have not been getting worse, and that they may be improving, versus pursuing a referral to pelvic floor physical therapy.  At this time, Frandy would prefer observation.    In regards to his varicoceles, we also discussed the natural history of varicoceles, and specifically discussed the fact that I believe these varicoceles are subclinical given that they are difficult to palpate on physical exam and there does not appear to be any change with Valsalva.  With this in mind, these are nothing that need to be intervened on and nothing that he needs to be concerned about as varicoceles are not dangerous.    Mr. Mondragon expressed understanding and agreement to the above discussion and plan and all of his questions were answered to his satisfaction.      PLAN  Observation of varicoceles and resolving pelvic floor tension myalgia  Follow-up with urology as needed    Signed by:    Beka Soares PA-C    I spent a total of 33 minutes spent on the date of the  encounter doing chart review, history and exam, documentation, and further activities as noted above.

## 2023-08-19 ENCOUNTER — HEALTH MAINTENANCE LETTER (OUTPATIENT)
Age: 33
End: 2023-08-19

## 2024-06-11 ENCOUNTER — OFFICE VISIT (OUTPATIENT)
Dept: FAMILY MEDICINE | Facility: CLINIC | Age: 34
End: 2024-06-11
Payer: COMMERCIAL

## 2024-06-11 VITALS
TEMPERATURE: 97.3 F | HEART RATE: 61 BPM | WEIGHT: 168.6 LBS | BODY MASS INDEX: 26.46 KG/M2 | DIASTOLIC BLOOD PRESSURE: 74 MMHG | OXYGEN SATURATION: 96 % | SYSTOLIC BLOOD PRESSURE: 118 MMHG | RESPIRATION RATE: 17 BRPM | HEIGHT: 67 IN

## 2024-06-11 DIAGNOSIS — R68.84 JAW PAIN: ICD-10-CM

## 2024-06-11 DIAGNOSIS — F17.200 TOBACCO USE DISORDER: ICD-10-CM

## 2024-06-11 DIAGNOSIS — Z00.00 ROUTINE GENERAL MEDICAL EXAMINATION AT A HEALTH CARE FACILITY: Primary | ICD-10-CM

## 2024-06-11 PROCEDURE — 99395 PREV VISIT EST AGE 18-39: CPT | Performed by: PHYSICIAN ASSISTANT

## 2024-06-11 SDOH — HEALTH STABILITY: PHYSICAL HEALTH: ON AVERAGE, HOW MANY DAYS PER WEEK DO YOU ENGAGE IN MODERATE TO STRENUOUS EXERCISE (LIKE A BRISK WALK)?: 7 DAYS

## 2024-06-11 SDOH — HEALTH STABILITY: PHYSICAL HEALTH: ON AVERAGE, HOW MANY MINUTES DO YOU ENGAGE IN EXERCISE AT THIS LEVEL?: 30 MIN

## 2024-06-11 ASSESSMENT — PAIN SCALES - GENERAL: PAINLEVEL: NO PAIN (0)

## 2024-06-11 ASSESSMENT — SOCIAL DETERMINANTS OF HEALTH (SDOH)
HOW OFTEN DO YOU GET TOGETHER WITH FRIENDS OR RELATIVES?: ONCE A WEEK
HOW OFTEN DO YOU GET TOGETHER WITH FRIENDS OR RELATIVES?: ONCE A WEEK

## 2024-06-11 NOTE — PROGRESS NOTES
"Preventive Care Visit  [facility]  Mike Elizalde PA-C, Physician Assistant  Jun 11, 2024      Assessment & Plan     (Z00.00) Routine general medical examination at a health care facility  (primary encounter diagnosis)  Comment:   Plan: smoking cessation advised, advised to continue exercise routine for stress mgmt    (R68.84) Jaw pain  Comment:   Plan: TMJ discussed, advised night time mouthguard to relieve sx.    (F17.200) Tobacco use disorder  Comment:   Plan: not yet ready to quit, offered resources    Nicotine/Tobacco Cessation  He reports that he has been smoking cigarettes. He started smoking about 15 years ago. He has a 7.5 pack-year smoking history. He has never used smokeless tobacco.  Nicotine/Tobacco Cessation Plan  Information offered: Patient not interested at this time    BMI  Estimated body mass index is 26.15 kg/m  as calculated from the following:    Height as of this encounter: 1.71 m (5' 7.32\").    Weight as of this encounter: 76.5 kg (168 lb 9.6 oz).       Counseling  Appropriate preventive services were discussed with this patient, including applicable screening as appropriate for fall prevention, nutrition, physical activity, Tobacco-use cessation, weight loss and cognition.  Checklist reviewing preventive services available has been given to the patient.  Reviewed patient's diet, addressing concerns and/or questions.   The patient was instructed to see the dentist every 6 months.           Flaco Wise is a 33 year old, presenting for the following:  Physical        6/11/2024     8:41 AM   Additional Questions   Roomed by Chey MARTÍNEZ   Accompanied by self        Health Care Directive  Patient does not have a Health Care Directive or Living Will: Discussed advance care planning with patient; however, patient declined at this time.    HPI    Concerns for R jaw pain over the past 2 years, no previous imaging, has seen ENT and dentist without any relief. Noting " clicking in jaw, no pain with eating, no restricted jaw opening        6/11/2024   General Health   How would you rate your overall physical health? Good   Feel stress (tense, anxious, or unable to sleep) To some extent   (!) STRESS CONCERN      6/11/2024   Nutrition   Three or more servings of calcium each day? (!) NO   Diet: Regular (no restrictions)   How many servings of fruit and vegetables per day? (!) 2-3   How many sweetened beverages each day? 0-1         6/11/2024   Exercise   Days per week of moderate/strenous exercise 7 days   Average minutes spent exercising at this level 30 min         6/11/2024   Social Factors   Frequency of gathering with friends or relatives Once a week   Worry food won't last until get money to buy more No   Food not last or not have enough money for food? No   Do you have housing?  Yes   Are you worried about losing your housing? No   Lack of transportation? No   Unable to get utilities (heat,electricity)? No         6/11/2024   Dental   Dentist two times every year? (!) NO       Today's PHQ-2 Score:       6/11/2024     8:36 AM   PHQ-2 ( 1999 [employer])   Q1: Little interest or pleasure in doing things 0   Q2: Feeling down, depressed or hopeless 0   PHQ-2 Score 0   Q1: Little interest or pleasure in doing things Not at all   Q2: Feeling down, depressed or hopeless Not at all   PHQ-2 Score 0           6/11/2024   Substance Use   Alcohol more than 3/day or more than 7/wk No   Do you use any other substances recreationally? No     Social History     Tobacco Use    Smoking status: Every Day     Types: Cigarettes     Start date: 6/4/2009    Smokeless tobacco: Never   Vaping Use    Vaping status: Never Used   Substance Use Topics    Alcohol use: Yes     Comment: 0 to 7 / week     Drug use: No           6/11/2024   STI Screening   New sexual partner(s) since last STI/HIV test? No         6/11/2024   Contraception/Family Planning   Questions about contraception or family planning No       "  Reviewed and updated as needed this visit by Provider                  BP Readings from Last 3 Encounters:   06/11/24 118/74   05/30/23 111/77   04/18/23 106/72    Wt Readings from Last 3 Encounters:   06/11/24 76.5 kg (168 lb 9.6 oz)   05/30/23 72.6 kg (160 lb)   04/18/23 72.7 kg (160 lb 3.2 oz)              Objective    Exam  There were no vitals taken for this visit.   Estimated body mass index is 24.33 kg/m  as calculated from the following:    Height as of 5/30/23: 1.727 m (5' 8\").    Weight as of 5/30/23: 72.6 kg (160 lb).    Physical Exam  GENERAL: alert and no distress  EYES: Eyes grossly normal to inspection, PERRL and conjunctivae and sclerae normal  HENT: ear canals and TM's normal, nose and mouth without ulcers or lesions, clicking with jaw opening, mild TTP of R TMJ, no lateral movements with jaw opening  NECK: no adenopathy, no asymmetry, masses, or scars  RESP: lungs clear to auscultation - no rales, rhonchi or wheezes  CV: regular rate and rhythm, normal S1 S2, no S3 or S4, no murmur, click or rub, no peripheral edema  ABDOMEN: soft, nontender, no hepatosplenomegaly, no masses and bowel sounds normal  MS: no gross musculoskeletal defects noted, no edema  SKIN: no suspicious lesions or rashes  NEURO: Normal strength and tone, mentation intact and speech normal  PSYCH: mentation appears normal, affect normal/bright        Signed Electronically by: Mike Elizalde PA-C    "

## 2024-06-11 NOTE — PATIENT INSTRUCTIONS
"Preventive Care Advice   This is general advice we often give to help people stay healthy. Your care team may have specific advice just for you. Please talk to your care team about your own preventive care needs.  Lifestyle  Exercise at least 150 minutes each week (30 minutes a day, 5 days a week).  Do muscle strengthening activities 2 days a week. These help control your weight and prevent disease.  No smoking.  Wear sunscreen to prevent skin cancer.  Have your home tested for radon every 2 to 5 years. Radon is a colorless, odorless gas that can harm your lungs. To learn more, go to www.health.[facility].mn. and search for \"Radon in Homes.\"  Keep guns unloaded and locked up in a safe place like a safe or gun vault, or, use a gun lock and hide the keys. Always lock away bullets separately. To learn more, visit [employer].gov and search for \"safe gun storage.\"  Nutrition  Eat 5 or more servings of fruits and vegetables each day.  Try wheat bread, brown rice and whole grain pasta (instead of white bread, rice, and pasta).  Get enough calcium and vitamin D. Check the label on foods and aim for 100% of the RDA (recommended daily allowance).  Regular exams  Have a dental exam and cleaning every 6 months.  See your health care team every year to talk about:  Any changes in your health.  Any medicines your care team has prescribed.  Preventive care, family planning, and ways to prevent chronic diseases.  Shots (vaccines)   HPV shots (up to age 26), if you've never had them before.  Hepatitis B shots (up to age 59), if you've never had them before.  COVID-19 shot: Get this shot when it's due.  Flu shot: Get a flu shot every year.  Tetanus shot: Get a tetanus shot every 10 years.  Pneumococcal, hepatitis A, and RSV shots: Ask your care team if you need these based on your risk.  Shingles shot (for age 50 and up).  General health tests  Diabetes screening:  Starting at age 35, Get screened for diabetes at least every 3 years.  If " you are younger than age 35, ask your care team if you should be screened for diabetes.  Cholesterol test: At age 39, start having a cholesterol test every 5 years, or more often if advised.  Bone density scan (DEXA): At age 50, ask your care team if you should have this scan for osteoporosis (brittle bones).  Hepatitis C: Get tested at least once in your life.  Abdominal aortic aneurysm screening: Talk to your doctor about having this screening if you:  Have ever smoked; and  Are biologically male; and  Are between the ages of 65 and 75.  STIs (sexually transmitted infections)  Before age 24: Ask your care team if you should be screened for STIs.  After age 24: Get screened for STIs if you're at risk. You are at risk for STIs (including HIV) if:  You are sexually active with more than one person.  You don't use condoms every time.  You or a partner was diagnosed with a sexually transmitted infection.  If you are at risk for HIV, ask about PrEP medicine to prevent HIV.  Get tested for HIV at least once in your life, whether you are at risk for HIV or not.  Cancer screening tests  Cervical cancer screening: If you have a cervix, begin getting regular cervical cancer screening tests at age 21. Most people who have regular screenings with normal results can stop after age 65. Talk about this with your provider.  Breast cancer scan (mammogram): If you've ever had breasts, begin having regular mammograms starting at age 40. This is a scan to check for breast cancer.  Colon cancer screening: It is important to start screening for colon cancer at age 45.  Have a colonoscopy test every 10 years (or more often if you're at risk) Or, ask your provider about stool tests like a FIT test every year or Cologuard test every 3 years.  To learn more about your testing options, visit: www.[employer]/332539.pdf.  For help making a decision, visit: jocelin/yh53998.  Prostate cancer screening test: If you have a prostate and are age 55  to 69, ask your provider if you would benefit from a yearly prostate cancer screening test.  Lung cancer screening: If you are a current or former smoker age 50 to 80, ask your care team if ongoing lung cancer screenings are right for you.  For informational purposes only. Not to replace the advice of your health care provider. Copyright   2023 [employer]. All rights reserved. Clinically reviewed by the [facility] Transitions Program. [place] 605363 - REV 04/24.

## 2024-06-24 ENCOUNTER — OFFICE VISIT (OUTPATIENT)
Dept: URGENT CARE | Facility: URGENT CARE | Age: 34
End: 2024-06-24
Payer: COMMERCIAL

## 2024-06-24 DIAGNOSIS — J02.9 SORE THROAT: Primary | ICD-10-CM

## 2024-06-24 PROCEDURE — 99207 PR NO CHARGE LOS: CPT | Mod: 25

## 2024-06-24 NOTE — PROGRESS NOTES
Patient arrived when internet was down and no testing could be performed.  Concerned about strep and mono.  Patient will return tomorrow.

## 2024-08-26 ENCOUNTER — OFFICE VISIT (OUTPATIENT)
Dept: FAMILY MEDICINE | Facility: CLINIC | Age: 34
End: 2024-08-26
Payer: COMMERCIAL

## 2024-08-26 VITALS
HEIGHT: 67 IN | TEMPERATURE: 98 F | BODY MASS INDEX: 26.23 KG/M2 | DIASTOLIC BLOOD PRESSURE: 60 MMHG | RESPIRATION RATE: 20 BRPM | SYSTOLIC BLOOD PRESSURE: 110 MMHG | WEIGHT: 167.1 LBS | OXYGEN SATURATION: 98 % | HEART RATE: 74 BPM

## 2024-08-26 DIAGNOSIS — J02.9 SORE THROAT: ICD-10-CM

## 2024-08-26 DIAGNOSIS — R59.0 CERVICAL LYMPHADENOPATHY: Primary | ICD-10-CM

## 2024-08-26 DIAGNOSIS — K14.6 BURNING TONGUE: ICD-10-CM

## 2024-08-26 LAB
BASOPHILS # BLD AUTO: 0.1 10E3/UL (ref 0–0.2)
BASOPHILS NFR BLD AUTO: 1 %
EOSINOPHIL # BLD AUTO: 0.3 10E3/UL (ref 0–0.7)
EOSINOPHIL NFR BLD AUTO: 4 %
ERYTHROCYTE [DISTWIDTH] IN BLOOD BY AUTOMATED COUNT: 12.3 % (ref 10–15)
FERRITIN SERPL-MCNC: 152 NG/ML (ref 31–409)
FOLATE SERPL-MCNC: 14 NG/ML (ref 4.6–34.8)
HCT VFR BLD AUTO: 42.5 % (ref 40–53)
HGB BLD-MCNC: 14.5 G/DL (ref 13.3–17.7)
IMM GRANULOCYTES # BLD: 0 10E3/UL
IMM GRANULOCYTES NFR BLD: 0 %
IRON BINDING CAPACITY (ROCHE): 235 UG/DL (ref 240–430)
IRON SATN MFR SERPL: 73 % (ref 15–46)
IRON SERPL-MCNC: 171 UG/DL (ref 61–157)
LYMPHOCYTES # BLD AUTO: 2.3 10E3/UL (ref 0.8–5.3)
LYMPHOCYTES NFR BLD AUTO: 32 %
MCH RBC QN AUTO: 31.6 PG (ref 26.5–33)
MCHC RBC AUTO-ENTMCNC: 34.1 G/DL (ref 31.5–36.5)
MCV RBC AUTO: 93 FL (ref 78–100)
MONOCYTES # BLD AUTO: 0.7 10E3/UL (ref 0–1.3)
MONOCYTES NFR BLD AUTO: 11 %
MONOCYTES NFR BLD AUTO: NEGATIVE %
NEUTROPHILS # BLD AUTO: 3.7 10E3/UL (ref 1.6–8.3)
NEUTROPHILS NFR BLD AUTO: 53 %
PLATELET # BLD AUTO: 238 10E3/UL (ref 150–450)
RBC # BLD AUTO: 4.59 10E6/UL (ref 4.4–5.9)
VIT B12 SERPL-MCNC: 610 PG/ML (ref 232–1245)
WBC # BLD AUTO: 7.1 10E3/UL (ref 4–11)

## 2024-08-26 PROCEDURE — 82746 ASSAY OF FOLIC ACID SERUM: CPT | Performed by: PHYSICIAN ASSISTANT

## 2024-08-26 PROCEDURE — 99213 OFFICE O/P EST LOW 20 MIN: CPT | Performed by: PHYSICIAN ASSISTANT

## 2024-08-26 PROCEDURE — 36415 COLL VENOUS BLD VENIPUNCTURE: CPT | Performed by: PHYSICIAN ASSISTANT

## 2024-08-26 PROCEDURE — 82728 ASSAY OF FERRITIN: CPT | Performed by: PHYSICIAN ASSISTANT

## 2024-08-26 PROCEDURE — 82607 VITAMIN B-12: CPT | Performed by: PHYSICIAN ASSISTANT

## 2024-08-26 PROCEDURE — 83550 IRON BINDING TEST: CPT | Performed by: PHYSICIAN ASSISTANT

## 2024-08-26 PROCEDURE — 85025 COMPLETE CBC W/AUTO DIFF WBC: CPT | Performed by: PHYSICIAN ASSISTANT

## 2024-08-26 PROCEDURE — 86308 HETEROPHILE ANTIBODY SCREEN: CPT | Performed by: PHYSICIAN ASSISTANT

## 2024-08-26 PROCEDURE — 83540 ASSAY OF IRON: CPT | Performed by: PHYSICIAN ASSISTANT

## 2024-08-26 NOTE — PROGRESS NOTES
"  Assessment & Plan     (R59.0) Cervical lymphadenopathy  (primary encounter diagnosis)  (J02.9) Sore throat  (K14.6) Burning tongue    Comment:   Plan: Iron and iron binding capacity, Ferritin, Adult        ENT  Referral, Vitamin B12, Folate          Based on continued sx after ABX and PO steroid tx, we will rule out other causes with labs today, and refer to alternative ENT for evaluation of possible tonsillectomy and or lymph node biopsy.    The longitudinal plan of care for the diagnosis(es)/condition(s) as documented were addressed during this visit. Due to the added complexity in care, I will continue to support Frandy in the subsequent management and with ongoing continuity of care.    BMI  Estimated body mass index is 26.17 kg/m  as calculated from the following:    Height as of this encounter: 1.702 m (5' 7\").    Weight as of this encounter: 75.8 kg (167 lb 1.6 oz).             Subjective   Frandy is a 34 year old, presenting for the following health issues:  Throat Problem      8/26/2024     8:30 AM   Additional Questions   Roomed by Ana Maria   Accompanied by alone         8/26/2024     8:30 AM   Patient Reported Additional Medications   Patient reports taking the following new medications none     History of Present Illness       Reason for visit:  Swolen tonsils, odd tastes and sensation in mouthand tongue  Symptom onset:  More than a month  Symptoms include:  Swolen tonsils, matalic taste,tongue tingles? congestion  Symptom intensity:  Moderate  Symptom progression:  Staying the same  Had these symptoms before:  Yes  Has tried/received treatment for these symptoms:  No  What makes it worse:  Dehydration. worse in the morning  What makes it better:  Not realy   He is taking medications regularly.     He has been to  2x since June for sore throat, has a nx of L submandibular lymph node enlargement, known dental infections that needs extraction of wisdom teeth.    Noting that tx with ABX and PO steroid " "helped with ST sx, but also continues to have burning of tongue.    Previous ENT eval for nasal congestion in 2018 with negative workup, was advised flonase prn                Objective    /60 (BP Location: Right arm, Patient Position: Sitting, Cuff Size: Adult Regular)   Pulse 74   Temp 98  F (36.7  C) (Temporal)   Resp 20   Ht 1.702 m (5' 7\")   Wt 75.8 kg (167 lb 1.6 oz)   SpO2 98%   BMI 26.17 kg/m    Body mass index is 26.17 kg/m .  Physical Exam   GENERAL: alert and no distress  EYES: Eyes grossly normal to inspection, PERRL and conjunctivae and sclerae normal  HENT: ear canals and TM's normal, nose and mouth without ulcers or lesions; posterior erythema dn tonsils 2+ BL, no abscess  NECK: no adenopathy, no asymmetry, masses, or scars  RESP: lungs clear to auscultation - no rales, rhonchi or wheezes  CV: regular rate and rhythm, normal S1 S2, no S3 or S4, no murmur, click or rub, no peripheral edema            Signed Electronically by: Mike Elizalde PA-C    "

## 2024-08-27 DIAGNOSIS — R79.89 HIGH TOTAL IRON BINDING CAPACITY: Primary | ICD-10-CM

## 2025-05-12 ENCOUNTER — PATIENT OUTREACH (OUTPATIENT)
Dept: CARE COORDINATION | Facility: CLINIC | Age: 35
End: 2025-05-12
Payer: COMMERCIAL

## 2025-05-26 ENCOUNTER — PATIENT OUTREACH (OUTPATIENT)
Dept: CARE COORDINATION | Facility: CLINIC | Age: 35
End: 2025-05-26
Payer: COMMERCIAL

## 2025-07-26 ENCOUNTER — HEALTH MAINTENANCE LETTER (OUTPATIENT)
Age: 35
End: 2025-07-26